# Patient Record
Sex: FEMALE | Race: ASIAN | NOT HISPANIC OR LATINO
[De-identification: names, ages, dates, MRNs, and addresses within clinical notes are randomized per-mention and may not be internally consistent; named-entity substitution may affect disease eponyms.]

---

## 2019-02-19 ENCOUNTER — APPOINTMENT (OUTPATIENT)
Dept: PULMONOLOGY | Facility: CLINIC | Age: 54
End: 2019-02-19
Payer: MEDICAID

## 2019-02-19 VITALS
DIASTOLIC BLOOD PRESSURE: 95 MMHG | HEART RATE: 111 BPM | SYSTOLIC BLOOD PRESSURE: 139 MMHG | HEIGHT: 62.5 IN | BODY MASS INDEX: 33.73 KG/M2 | OXYGEN SATURATION: 96 % | WEIGHT: 188 LBS

## 2019-02-19 DIAGNOSIS — J45.909 UNSPECIFIED ASTHMA, UNCOMPLICATED: ICD-10-CM

## 2019-02-19 DIAGNOSIS — G47.00 INSOMNIA, UNSPECIFIED: ICD-10-CM

## 2019-02-19 PROCEDURE — 94060 EVALUATION OF WHEEZING: CPT | Mod: 26

## 2019-02-19 PROCEDURE — 94729 DIFFUSING CAPACITY: CPT | Mod: 26

## 2019-02-19 PROCEDURE — 99204 OFFICE O/P NEW MOD 45 MIN: CPT | Mod: 25

## 2019-02-19 PROCEDURE — 94727 GAS DIL/WSHOT DETER LNG VOL: CPT | Mod: 26

## 2019-02-19 NOTE — REVIEW OF SYSTEMS
[Fatigue] : fatigue [Cough] : cough [Dyspnea] : dyspnea [Wheezing] : wheezing [Heartburn] : heartburn [Reflux] : reflux [Constipation] : constipation [Myalgias] : myalgias [Arthralgias] : arthralgias [Difficulty Initiating Sleep] : difficulty falling asleep [Difficulty Maintaining Sleep] : difficulty maintaining sleep [Negative] : Sleep Disorder

## 2019-02-20 PROBLEM — G47.00 INSOMNIA: Status: ACTIVE | Noted: 2019-02-20

## 2019-02-20 PROBLEM — J45.909 ASTHMA: Status: ACTIVE | Noted: 2019-02-20

## 2019-04-18 ENCOUNTER — APPOINTMENT (OUTPATIENT)
Dept: PULMONOLOGY | Facility: CLINIC | Age: 54
End: 2019-04-18

## 2019-04-18 NOTE — PROCEDURE
[FreeTextEntry1] : The pulmonary function testing done reveals moderate obstructive ventilatory impairment.

## 2019-04-18 NOTE — HISTORY OF PRESENT ILLNESS
[FreeTextEntry1] : Patient is a 53 year old lady with h/o asthma since age 16 years. It was chronic and she took steroids for many years.  She migrated from [facility] 30 years back.  She was hospitalized  for asthma about about 20 years back. She has improved a lot recently.  She rarely takes inhalers.\par She is a non smoker. She climbs stairs.  It does not bother her. \par She goes to bed around 12 mn to 2 AM. 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 Because of that she's not able to sleep at night. She is worried that her mother might need her. Due to lack of sleep during the night ,she is very exhausted during the day. She has not been exercising. She has gained weight in the last 6 months.\par

## 2019-04-18 NOTE — DISCUSSION/SUMMARY
[FreeTextEntry1] : The patient has moderate obstructive ventilatory impairment most likely bronchial asthma. The patient has not been currently treated for it.\par In addition the patient has significant sleep onset and sleep onset insomnia and anxiety related to personal issues. She has gained weight recently because of lifestyle issues.\par The patient advised to try and exercise in the morning daily. She was advised to fix the bedtime at night. She was advised to try to take melatonin and magnesium at bed time.\par She was advised to eat  low carbohydrate  largely plant-based diet.

## 2019-05-21 ENCOUNTER — INPATIENT (INPATIENT)
Facility: HOSPITAL | Age: 54
LOS: 1 days | Discharge: HOME | DRG: 66 | End: 2019-05-23
Attending: NEUROLOGICAL SURGERY | Admitting: NEUROLOGICAL SURGERY
Payer: MEDICAID

## 2019-05-21 ENCOUNTER — EMERGENCY (EMERGENCY)
Facility: HOSPITAL | Age: 54
LOS: 1 days | Discharge: ANOTHER HOSPITAL | End: 2019-05-21
Attending: EMERGENCY MEDICINE | Admitting: EMERGENCY MEDICINE
Payer: MEDICAID

## 2019-05-21 ENCOUNTER — APPOINTMENT (OUTPATIENT)
Age: 54
End: 2019-05-21
Payer: MEDICAID

## 2019-05-21 ENCOUNTER — TRANSCRIPTION ENCOUNTER (OUTPATIENT)
Age: 54
End: 2019-05-21

## 2019-05-21 VITALS
DIASTOLIC BLOOD PRESSURE: 110 MMHG | RESPIRATION RATE: 18 BRPM | TEMPERATURE: 98 F | OXYGEN SATURATION: 100 % | HEART RATE: 107 BPM | SYSTOLIC BLOOD PRESSURE: 160 MMHG

## 2019-05-21 VITALS
WEIGHT: 179.9 LBS | OXYGEN SATURATION: 96 % | DIASTOLIC BLOOD PRESSURE: 72 MMHG | SYSTOLIC BLOOD PRESSURE: 115 MMHG | HEART RATE: 122 BPM | RESPIRATION RATE: 19 BRPM | HEIGHT: 64 IN

## 2019-05-21 VITALS
SYSTOLIC BLOOD PRESSURE: 119 MMHG | HEART RATE: 102 BPM | OXYGEN SATURATION: 98 % | DIASTOLIC BLOOD PRESSURE: 82 MMHG | RESPIRATION RATE: 18 BRPM | TEMPERATURE: 99 F

## 2019-05-21 DIAGNOSIS — G91.9 HYDROCEPHALUS, UNSPECIFIED: ICD-10-CM

## 2019-05-21 DIAGNOSIS — I60.9 NONTRAUMATIC SUBARACHNOID HEMORRHAGE, UNSPECIFIED: ICD-10-CM

## 2019-05-21 LAB
ALBUMIN SERPL ELPH-MCNC: 4.4 G/DL — SIGNIFICANT CHANGE UP (ref 3.3–5)
ALBUMIN SERPL ELPH-MCNC: 4.4 G/DL — SIGNIFICANT CHANGE UP (ref 3.3–5)
ALP SERPL-CCNC: 131 U/L — HIGH (ref 40–120)
ALP SERPL-CCNC: 138 U/L — HIGH (ref 40–120)
ALT FLD-CCNC: 30 U/L — SIGNIFICANT CHANGE UP (ref 4–33)
ALT FLD-CCNC: 34 U/L — SIGNIFICANT CHANGE UP (ref 10–45)
ANION GAP SERPL CALC-SCNC: 14 MMO/L — SIGNIFICANT CHANGE UP (ref 7–14)
ANION GAP SERPL CALC-SCNC: 14 MMOL/L — SIGNIFICANT CHANGE UP (ref 5–17)
APTT BLD: 32.1 SEC — SIGNIFICANT CHANGE UP (ref 27.5–36.3)
APTT BLD: 34 SEC — SIGNIFICANT CHANGE UP (ref 27.5–36.3)
AST SERPL-CCNC: 20 U/L — SIGNIFICANT CHANGE UP (ref 10–40)
AST SERPL-CCNC: 21 U/L — SIGNIFICANT CHANGE UP (ref 4–32)
BASOPHILS # BLD AUTO: 0.05 K/UL — SIGNIFICANT CHANGE UP (ref 0–0.2)
BASOPHILS # BLD AUTO: 0.1 K/UL — SIGNIFICANT CHANGE UP (ref 0–0.2)
BASOPHILS NFR BLD AUTO: 0.4 % — SIGNIFICANT CHANGE UP (ref 0–2)
BASOPHILS NFR BLD AUTO: 0.5 % — SIGNIFICANT CHANGE UP (ref 0–2)
BILIRUB SERPL-MCNC: 0.3 MG/DL — SIGNIFICANT CHANGE UP (ref 0.2–1.2)
BILIRUB SERPL-MCNC: 0.3 MG/DL — SIGNIFICANT CHANGE UP (ref 0.2–1.2)
BLD GP AB SCN SERPL QL: NEGATIVE — SIGNIFICANT CHANGE UP
BLD GP AB SCN SERPL QL: NEGATIVE — SIGNIFICANT CHANGE UP
BUN SERPL-MCNC: 6 MG/DL — LOW (ref 7–23)
BUN SERPL-MCNC: 8 MG/DL — SIGNIFICANT CHANGE UP (ref 7–23)
CALCIUM SERPL-MCNC: 9.8 MG/DL — SIGNIFICANT CHANGE UP (ref 8.4–10.5)
CALCIUM SERPL-MCNC: 9.8 MG/DL — SIGNIFICANT CHANGE UP (ref 8.4–10.5)
CHLORIDE SERPL-SCNC: 100 MMOL/L — SIGNIFICANT CHANGE UP (ref 96–108)
CHLORIDE SERPL-SCNC: 97 MMOL/L — LOW (ref 98–107)
CHOLEST SERPL-MCNC: 194 MG/DL — SIGNIFICANT CHANGE UP (ref 10–199)
CO2 SERPL-SCNC: 25 MMOL/L — SIGNIFICANT CHANGE UP (ref 22–31)
CO2 SERPL-SCNC: 25 MMOL/L — SIGNIFICANT CHANGE UP (ref 22–31)
CREAT SERPL-MCNC: 0.54 MG/DL — SIGNIFICANT CHANGE UP (ref 0.5–1.3)
CREAT SERPL-MCNC: 0.64 MG/DL — SIGNIFICANT CHANGE UP (ref 0.5–1.3)
EOSINOPHIL # BLD AUTO: 0 K/UL — SIGNIFICANT CHANGE UP (ref 0–0.5)
EOSINOPHIL # BLD AUTO: 0.02 K/UL — SIGNIFICANT CHANGE UP (ref 0–0.5)
EOSINOPHIL NFR BLD AUTO: 0.1 % — SIGNIFICANT CHANGE UP (ref 0–6)
EOSINOPHIL NFR BLD AUTO: 0.4 % — SIGNIFICANT CHANGE UP (ref 0–6)
GLUCOSE SERPL-MCNC: 134 MG/DL — HIGH (ref 70–99)
GLUCOSE SERPL-MCNC: 146 MG/DL — HIGH (ref 70–99)
HBA1C BLD-MCNC: 6.3 % — HIGH (ref 4–5.6)
HCT VFR BLD CALC: 36.6 % — SIGNIFICANT CHANGE UP (ref 34.5–45)
HCT VFR BLD CALC: 37.3 % — SIGNIFICANT CHANGE UP (ref 34.5–45)
HDLC SERPL-MCNC: 56 MG/DL — SIGNIFICANT CHANGE UP
HGB BLD-MCNC: 11.7 G/DL — SIGNIFICANT CHANGE UP (ref 11.5–15.5)
HGB BLD-MCNC: 12.5 G/DL — SIGNIFICANT CHANGE UP (ref 11.5–15.5)
IMM GRANULOCYTES NFR BLD AUTO: 0.5 % — SIGNIFICANT CHANGE UP (ref 0–1.5)
INR BLD: 1.03 — SIGNIFICANT CHANGE UP (ref 0.88–1.17)
INR BLD: 1.05 RATIO — SIGNIFICANT CHANGE UP (ref 0.88–1.16)
LIPID PNL WITH DIRECT LDL SERPL: 123 MG/DL — HIGH
LYMPHOCYTES # BLD AUTO: 14.7 % — SIGNIFICANT CHANGE UP (ref 13–44)
LYMPHOCYTES # BLD AUTO: 2.03 K/UL — SIGNIFICANT CHANGE UP (ref 1–3.3)
LYMPHOCYTES # BLD AUTO: 2.6 K/UL — SIGNIFICANT CHANGE UP (ref 1–3.3)
LYMPHOCYTES # BLD AUTO: 22.6 % — SIGNIFICANT CHANGE UP (ref 13–44)
MAGNESIUM SERPL-MCNC: 2.1 MG/DL — SIGNIFICANT CHANGE UP (ref 1.6–2.6)
MCHC RBC-ENTMCNC: 24.7 PG — LOW (ref 27–34)
MCHC RBC-ENTMCNC: 26.4 PG — LOW (ref 27–34)
MCHC RBC-ENTMCNC: 32 % — SIGNIFICANT CHANGE UP (ref 32–36)
MCHC RBC-ENTMCNC: 33.4 GM/DL — SIGNIFICANT CHANGE UP (ref 32–36)
MCV RBC AUTO: 77.4 FL — LOW (ref 80–100)
MCV RBC AUTO: 79 FL — LOW (ref 80–100)
MONOCYTES # BLD AUTO: 0.49 K/UL — SIGNIFICANT CHANGE UP (ref 0–0.9)
MONOCYTES # BLD AUTO: 0.6 K/UL — SIGNIFICANT CHANGE UP (ref 0–0.9)
MONOCYTES NFR BLD AUTO: 3.6 % — SIGNIFICANT CHANGE UP (ref 2–14)
MONOCYTES NFR BLD AUTO: 4.7 % — SIGNIFICANT CHANGE UP (ref 2–14)
NEUTROPHILS # BLD AUTO: 11.11 K/UL — HIGH (ref 1.8–7.4)
NEUTROPHILS # BLD AUTO: 8.4 K/UL — HIGH (ref 1.8–7.4)
NEUTROPHILS NFR BLD AUTO: 71.8 % — SIGNIFICANT CHANGE UP (ref 43–77)
NEUTROPHILS NFR BLD AUTO: 80.7 % — HIGH (ref 43–77)
NRBC # FLD: 0 K/UL — SIGNIFICANT CHANGE UP (ref 0–0)
PHOSPHATE SERPL-MCNC: 4 MG/DL — SIGNIFICANT CHANGE UP (ref 2.5–4.5)
PLATELET # BLD AUTO: 333 K/UL — SIGNIFICANT CHANGE UP (ref 150–400)
PLATELET # BLD AUTO: 336 K/UL — SIGNIFICANT CHANGE UP (ref 150–400)
PMV BLD: 10.9 FL — SIGNIFICANT CHANGE UP (ref 7–13)
POTASSIUM SERPL-MCNC: 3.7 MMOL/L — SIGNIFICANT CHANGE UP (ref 3.5–5.3)
POTASSIUM SERPL-MCNC: 4.1 MMOL/L — SIGNIFICANT CHANGE UP (ref 3.5–5.3)
POTASSIUM SERPL-SCNC: 3.7 MMOL/L — SIGNIFICANT CHANGE UP (ref 3.5–5.3)
POTASSIUM SERPL-SCNC: 4.1 MMOL/L — SIGNIFICANT CHANGE UP (ref 3.5–5.3)
PROT SERPL-MCNC: 7.7 G/DL — SIGNIFICANT CHANGE UP (ref 6–8.3)
PROT SERPL-MCNC: 7.8 G/DL — SIGNIFICANT CHANGE UP (ref 6–8.3)
PROTHROM AB SERPL-ACNC: 11.8 SEC — SIGNIFICANT CHANGE UP (ref 9.8–13.1)
PROTHROM AB SERPL-ACNC: 12 SEC — SIGNIFICANT CHANGE UP (ref 10–12.9)
RBC # BLD: 4.72 M/UL — SIGNIFICANT CHANGE UP (ref 3.8–5.2)
RBC # BLD: 4.73 M/UL — SIGNIFICANT CHANGE UP (ref 3.8–5.2)
RBC # FLD: 15.4 % — HIGH (ref 10.3–14.5)
RBC # FLD: 17.4 % — HIGH (ref 10.3–14.5)
RH IG SCN BLD-IMP: NEGATIVE — SIGNIFICANT CHANGE UP
SODIUM SERPL-SCNC: 136 MMOL/L — SIGNIFICANT CHANGE UP (ref 135–145)
SODIUM SERPL-SCNC: 139 MMOL/L — SIGNIFICANT CHANGE UP (ref 135–145)
TOTAL CHOLESTEROL/HDL RATIO MEASUREMENT: 3.5 RATIO — SIGNIFICANT CHANGE UP (ref 3.3–7.1)
TRIGL SERPL-MCNC: 75 MG/DL — SIGNIFICANT CHANGE UP (ref 10–149)
WBC # BLD: 11.8 K/UL — HIGH (ref 3.8–10.5)
WBC # BLD: 13.77 K/UL — HIGH (ref 3.8–10.5)
WBC # FLD AUTO: 11.8 K/UL — HIGH (ref 3.8–10.5)
WBC # FLD AUTO: 13.77 K/UL — HIGH (ref 3.8–10.5)

## 2019-05-21 PROCEDURE — 99291 CRITICAL CARE FIRST HOUR: CPT

## 2019-05-21 PROCEDURE — 99285 EMERGENCY DEPT VISIT HI MDM: CPT | Mod: 25

## 2019-05-21 PROCEDURE — 99284 EMERGENCY DEPT VISIT MOD MDM: CPT

## 2019-05-21 PROCEDURE — 70450 CT HEAD/BRAIN W/O DYE: CPT | Mod: 26,79

## 2019-05-21 PROCEDURE — 99292 CRITICAL CARE ADDL 30 MIN: CPT

## 2019-05-21 PROCEDURE — 70498 CT ANGIOGRAPHY NECK: CPT | Mod: 26

## 2019-05-21 PROCEDURE — 93010 ELECTROCARDIOGRAM REPORT: CPT

## 2019-05-21 PROCEDURE — 36226 PLACE CATH VERTEBRAL ART: CPT | Mod: 50

## 2019-05-21 PROCEDURE — 70496 CT ANGIOGRAPHY HEAD: CPT | Mod: 26

## 2019-05-21 PROCEDURE — 36224 PLACE CATH CAROTD ART: CPT | Mod: 50

## 2019-05-21 PROCEDURE — 36227 PLACE CATH XTRNL CAROTID: CPT | Mod: 50

## 2019-05-21 RX ORDER — ACETAMINOPHEN 500 MG
650 TABLET ORAL EVERY 6 HOURS
Refills: 0 | Status: DISCONTINUED | OUTPATIENT
Start: 2019-05-21 | End: 2019-05-23

## 2019-05-21 RX ORDER — LEVETIRACETAM 250 MG/1
500 TABLET, FILM COATED ORAL
Refills: 0 | Status: DISCONTINUED | OUTPATIENT
Start: 2019-05-21 | End: 2019-05-21

## 2019-05-21 RX ORDER — NIMODIPINE 60 MG/10ML
60 SOLUTION ORAL EVERY 4 HOURS
Refills: 0 | Status: DISCONTINUED | OUTPATIENT
Start: 2019-05-21 | End: 2019-05-21

## 2019-05-21 RX ORDER — MAGNESIUM CARBONATE 54 MG/5 ML
0 LIQUID (ML) ORAL
Qty: 0 | Refills: 0 | DISCHARGE

## 2019-05-21 RX ORDER — SENNA PLUS 8.6 MG/1
2 TABLET ORAL AT BEDTIME
Refills: 0 | Status: DISCONTINUED | OUTPATIENT
Start: 2019-05-21 | End: 2019-05-23

## 2019-05-21 RX ORDER — LEVETIRACETAM 250 MG/1
500 TABLET, FILM COATED ORAL EVERY 12 HOURS
Refills: 0 | Status: DISCONTINUED | OUTPATIENT
Start: 2019-05-21 | End: 2019-05-21

## 2019-05-21 RX ORDER — OXYCODONE HYDROCHLORIDE 5 MG/1
5 TABLET ORAL EVERY 4 HOURS
Refills: 0 | Status: DISCONTINUED | OUTPATIENT
Start: 2019-05-21 | End: 2019-05-23

## 2019-05-21 RX ORDER — ACETAMINOPHEN 500 MG
975 TABLET ORAL ONCE
Refills: 0 | Status: COMPLETED | OUTPATIENT
Start: 2019-05-21 | End: 2019-05-21

## 2019-05-21 RX ORDER — METOCLOPRAMIDE HCL 10 MG
10 TABLET ORAL ONCE
Refills: 0 | Status: COMPLETED | OUTPATIENT
Start: 2019-05-21 | End: 2019-05-21

## 2019-05-21 RX ORDER — OXYCODONE HYDROCHLORIDE 5 MG/1
10 TABLET ORAL EVERY 4 HOURS
Refills: 0 | Status: DISCONTINUED | OUTPATIENT
Start: 2019-05-21 | End: 2019-05-23

## 2019-05-21 RX ORDER — NICARDIPINE HYDROCHLORIDE 30 MG/1
5 CAPSULE, EXTENDED RELEASE ORAL
Qty: 40 | Refills: 0 | Status: DISCONTINUED | OUTPATIENT
Start: 2019-05-21 | End: 2019-05-21

## 2019-05-21 RX ORDER — SODIUM CHLORIDE 9 MG/ML
1000 INJECTION INTRAMUSCULAR; INTRAVENOUS; SUBCUTANEOUS ONCE
Refills: 0 | Status: COMPLETED | OUTPATIENT
Start: 2019-05-21 | End: 2019-05-21

## 2019-05-21 RX ORDER — LIDOCAINE 4 G/100G
1 CREAM TOPICAL ONCE
Refills: 0 | Status: COMPLETED | OUTPATIENT
Start: 2019-05-21 | End: 2019-05-21

## 2019-05-21 RX ORDER — DOCUSATE SODIUM 100 MG
100 CAPSULE ORAL
Refills: 0 | Status: DISCONTINUED | OUTPATIENT
Start: 2019-05-21 | End: 2019-05-23

## 2019-05-21 RX ORDER — SODIUM CHLORIDE 9 MG/ML
1000 INJECTION INTRAMUSCULAR; INTRAVENOUS; SUBCUTANEOUS
Refills: 0 | Status: DISCONTINUED | OUTPATIENT
Start: 2019-05-21 | End: 2019-05-22

## 2019-05-21 RX ORDER — CHLORHEXIDINE GLUCONATE 213 G/1000ML
1 SOLUTION TOPICAL
Refills: 0 | Status: DISCONTINUED | OUTPATIENT
Start: 2019-05-21 | End: 2019-05-22

## 2019-05-21 RX ORDER — LEVETIRACETAM 250 MG/1
1000 TABLET, FILM COATED ORAL ONCE
Refills: 0 | Status: COMPLETED | OUTPATIENT
Start: 2019-05-21 | End: 2019-05-21

## 2019-05-21 RX ADMIN — OXYCODONE HYDROCHLORIDE 10 MILLIGRAM(S): 5 TABLET ORAL at 13:59

## 2019-05-21 RX ADMIN — SENNA PLUS 2 TABLET(S): 8.6 TABLET ORAL at 21:23

## 2019-05-21 RX ADMIN — SODIUM CHLORIDE 75 MILLILITER(S): 9 INJECTION INTRAMUSCULAR; INTRAVENOUS; SUBCUTANEOUS at 08:51

## 2019-05-21 RX ADMIN — SODIUM CHLORIDE 1000 MILLILITER(S): 9 INJECTION INTRAMUSCULAR; INTRAVENOUS; SUBCUTANEOUS at 04:14

## 2019-05-21 RX ADMIN — OXYCODONE HYDROCHLORIDE 10 MILLIGRAM(S): 5 TABLET ORAL at 18:31

## 2019-05-21 RX ADMIN — SODIUM CHLORIDE 75 MILLILITER(S): 9 INJECTION INTRAMUSCULAR; INTRAVENOUS; SUBCUTANEOUS at 17:50

## 2019-05-21 RX ADMIN — LEVETIRACETAM 400 MILLIGRAM(S): 250 TABLET, FILM COATED ORAL at 17:46

## 2019-05-21 RX ADMIN — NICARDIPINE HYDROCHLORIDE 25 MG/HR: 30 CAPSULE, EXTENDED RELEASE ORAL at 09:07

## 2019-05-21 RX ADMIN — OXYCODONE HYDROCHLORIDE 10 MILLIGRAM(S): 5 TABLET ORAL at 19:00

## 2019-05-21 RX ADMIN — Medication 10 MILLIGRAM(S): at 02:45

## 2019-05-21 RX ADMIN — Medication 650 MILLIGRAM(S): at 08:51

## 2019-05-21 RX ADMIN — CHLORHEXIDINE GLUCONATE 1 APPLICATION(S): 213 SOLUTION TOPICAL at 21:23

## 2019-05-21 RX ADMIN — Medication 975 MILLIGRAM(S): at 04:13

## 2019-05-21 RX ADMIN — OXYCODONE HYDROCHLORIDE 10 MILLIGRAM(S): 5 TABLET ORAL at 14:30

## 2019-05-21 RX ADMIN — NICARDIPINE HYDROCHLORIDE 25 MG/HR: 30 CAPSULE, EXTENDED RELEASE ORAL at 17:50

## 2019-05-21 RX ADMIN — LEVETIRACETAM 400 MILLIGRAM(S): 250 TABLET, FILM COATED ORAL at 06:43

## 2019-05-21 RX ADMIN — LIDOCAINE 1 PATCH: 4 CREAM TOPICAL at 04:41

## 2019-05-21 RX ADMIN — SODIUM CHLORIDE 1000 MILLILITER(S): 9 INJECTION INTRAMUSCULAR; INTRAVENOUS; SUBCUTANEOUS at 02:46

## 2019-05-21 NOTE — ED PROVIDER NOTE - PHYSICAL EXAMINATION
Neuro: CN2-12 grossly intact, A&Ox4, MS +5/5 in UE and LE BL, gross sensation intact in UE and LE BL

## 2019-05-21 NOTE — H&P ADULT - HISTORY OF PRESENT ILLNESS
p ([id number])     HPI: Alexandreazeke Garcia, 54 YO right handed female pmh asthma had sudden onset of WHOL while vacuuming last evening. States she had nausea and a brief period of diplopia which have both resolved. C/O severe headache mainly occipital, no neck pain. Denies focal motor or sensory loss, denies lethargy.HH1/MF1. CTH showed perimes heme. No ac/antiplt.

## 2019-05-21 NOTE — ED ADULT NURSE REASSESSMENT NOTE - NS ED NURSE REASSESS COMMENT FT1
Cardene stopped at 0917 due to blood pressure within normal limits, Neuro surg resident at bedside to transport the patient

## 2019-05-21 NOTE — ED ADULT NURSE NOTE - OBJECTIVE STATEMENT
received to room 24. complains of sudden onset headache 3 hours. also nausea/vomiting. denies any blurry vision, dizziness or neuro deficits. medicated as ordered. awaits ct in nad.

## 2019-05-21 NOTE — PROGRESS NOTE ADULT - SUBJECTIVE AND OBJECTIVE BOX
O:    T(C): 36.6 (05-21-19 @ 19:00), Max: 37.1 (05-21-19 @ 05:57)  HR: 115 (05-21-19 @ 19:15) (96 - 131)  BP: 129/89 (05-21-19 @ 18:00) (109/84 - 160/110)  RR: 16 (05-21-19 @ 19:15) (11 - 33)  SpO2: 94% (05-21-19 @ 19:15) (93% - 100%)  05-21-19 @ 07:01  -  05-21-19 @ 20:34  --------------------------------------------------------  IN: 920 mL / OUT: 700 mL / NET: 220 mL    acetaminophen   Tablet .. 650 milliGRAM(s) Oral every 6 hours PRN  chlorhexidine 4% Liquid 1 Application(s) Topical <User Schedule>  docusate sodium 100 milliGRAM(s) Oral two times a day  levETIRAcetam  IVPB 500 milliGRAM(s) IV Intermittent every 12 hours  niCARdipine Infusion 5 mG/Hr IV Continuous <Continuous>  niMODipine 60 milliGRAM(s) Oral every 4 hours  oxyCODONE    IR 5 milliGRAM(s) Oral every 4 hours PRN  oxyCODONE    IR 10 milliGRAM(s) Oral every 4 hours PRN  senna 2 Tablet(s) Oral at bedtime  sodium chloride 0.9%. 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 IR for aneurysm treatment  Seizure Prophylaxis: Levetiracetam until aneurysm treated  Delayed Cerebral Ischemia Management: Serial screening TCDs, euvolemia, nimodipine  Pain: PRN analgesics with Tylenol   Activity: [] OOB as tolerated [] Bedrest [] PT [] OT [] PMNR    PULM:  Incentive spirometry    CV:  SBP goal  TTE  Place [place]     RENAL:  Fluids:  NS @ 75   Parmar before Angio     GI:  Diet:  GI prophylaxis [x] not indicated [] PPI [] other:  Bowel regimen [x] colace [x] senna [] other:    ENDO:   Goal euglycemia (-180)    HEME/ONC:  VTE prophylaxis: [] SCDs [] chemoprophylaxis [x] hold chemoprophylaxis due to:  SAH   [] high risk of DVT/PE on admission due to:    ID: afebrile     SOCIAL/FAMILY:  [] awaiting [x] updated at bedside [] family meeting    CODE STATUS:  [x] Full Code [] DNR [] DNI [] Palliative/Comfort Care    DISPOSITION:  [x] ICU [] Stroke Unit [] Floor [] EMU [] RCU [] PCU    [x] Patient is at high risk of neurologic deterioration/death due to:  sezirues, herniation     Time spent: 45 critical care minutes O: angio showed no vascular malformation, no aneurysm     T(C): 36.6 (05-21-19 @ 19:00), Max: 37.1 (05-21-19 @ 05:57)  HR: 115 (05-21-19 @ 19:15) (96 - 131)  BP: 129/89 (05-21-19 @ 18:00) (109/84 - 160/110)  RR: 16 (05-21-19 @ 19:15) (11 - 33)  SpO2: 94% (05-21-19 @ 19:15) (93% - 100%)  05-21-19 @ 07:01  -  05-21-19 @ 20:34  --------------------------------------------------------  IN: 920 mL / OUT: 700 mL / NET: 220 mL    acetaminophen   Tablet .. 650 milliGRAM(s) Oral every 6 hours PRN  chlorhexidine 4% Liquid 1 Application(s) Topical <User Schedule>  docusate sodium 100 milliGRAM(s) Oral two times a day  levETIRAcetam  IVPB 500 milliGRAM(s) IV Intermittent every 12 hours  niCARdipine Infusion 5 mG/Hr IV Continuous <Continuous>  niMODipine 60 milliGRAM(s) Oral every 4 hours  oxyCODONE    IR 5 milliGRAM(s) Oral every 4 hours PRN  oxyCODONE    IR 10 milliGRAM(s) Oral every 4 hours PRN  senna 2 Tablet(s) Oral at bedtime  sodium chloride 0.9%. 1000 milliLiter(s) IV Continuous <Continuous>    EXAMINATION:  General:  calm  HEENT:  MMM  Neuro:  awake, alert, oriented x 3, follows commands, face symmetric  Pupils Equal and reactive, RUE 5/5,  LUE 5/5,  RLE 5/5,  LLE 5/5, no drift   Cards:  s1, s2 RRR  Respiratory:  lungs clear b/l   Adomen:  soft  Extremities:  no edema  Skin:  warm/dry      LABS:  Na: 139 (05-21 @ 11:55), 136 (05-21 @ 06:32)  K: 3.7 (05-21 @ 11:55), 4.1 (05-21 @ 06:32)  Cl: 100 (05-21 @ 11:55), 97 (05-21 @ 06:32)  CO2: 25 (05-21 @ 11:55), 25 (05-21 @ 06:32)  BUN: 6 (05-21 @ 11:55), 8 (05-21 @ 06:32)  Cr: 0.54 (05-21 @ 11:55), 0.64 (05-21 @ 06:32)  Glu: 134(05-21 @ 11:55), 146(05-21 @ 06:32)    Hgb: 12.5 (05-21 @ 11:55), 11.7 (05-21 @ 06:32)  Hct: 37.3 (05-21 @ 11:55), 36.6 (05-21 @ 06:32)  WBC: 11.8 (05-21 @ 11:55), 13.77 (05-21 @ 06:32)  Plt: 333 (05-21 @ 11:55), 336 (05-21 @ 06:32)    INR: 1.05 05-21-19 @ 11:55, 1.03 05-21-19 @ 06:32  PTT: 34.0 05-21-19 @ 11:55, 32.1 05-21-19 @ 06:32      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, MF1 Perimesencephalic SAH angio negative     NEURO:  Diagnosis: will need MRI brain and cervical spine to r/o vascular malformation   d/c keppra, d/c nimodipine as it is perimesencephalic   Pain: PRN analgesics with Tylenol   Activity: [] OOB as tolerated [] Bedrest [] PT [] OT [] PMNR    PULM:  Incentive spirometry    CV:  SBP goal 100-140 mmhg   TTE  Place [place]   groin check  pulse check     RENAL:  Fluids:  NS @ 75   Parmar     GI:  Diet: resume diet   GI prophylaxis [x] not indicated [] PPI [] other:  Bowel regimen [x] colace [x] senna [] other:    ENDO:   Goal euglycemia (-180)    HEME/ONC:  VTE prophylaxis: [] SCDs [] chemoprophylaxis [x] hold chemoprophylaxis due to:  SAH PBD0   [] high risk of DVT/PE on admission due to:    ID: afebrile     SOCIAL/FAMILY:  [] awaiting [x] updated at bedside [] family meeting    CODE STATUS:  [x] Full Code [] DNR [] DNI [] Palliative/Comfort Care    DISPOSITION:  [x] ICU [] Stroke Unit [] Floor [] EMU [] RCU [] PCU    [x] Patient is at high risk of neurologic deterioration/death due to:  sezirues, herniation     Time spent: 45 critical care minutes O: angio showed no vascular malformation, no aneurysm     T(C): 36.6 (05-21-19 @ 19:00), Max: 37.1 (05-21-19 @ 05:57)  HR: 115 (05-21-19 @ 19:15) (96 - 131)  BP: 129/89 (05-21-19 @ 18:00) (109/84 - 160/110)  RR: 16 (05-21-19 @ 19:15) (11 - 33)  SpO2: 94% (05-21-19 @ 19:15) (93% - 100%)  05-21-19 @ 07:01  -  05-21-19 @ 20:34  --------------------------------------------------------  IN: 920 mL / OUT: 700 mL / NET: 220 mL    acetaminophen   Tablet .. 650 milliGRAM(s) Oral every 6 hours PRN  chlorhexidine 4% Liquid 1 Application(s) Topical <User Schedule>  docusate sodium 100 milliGRAM(s) Oral two times a day  levETIRAcetam  IVPB 500 milliGRAM(s) IV Intermittent every 12 hours  niCARdipine Infusion 5 mG/Hr IV Continuous <Continuous>  niMODipine 60 milliGRAM(s) Oral every 4 hours  oxyCODONE    IR 5 milliGRAM(s) Oral every 4 hours PRN  oxyCODONE    IR 10 milliGRAM(s) Oral every 4 hours PRN  senna 2 Tablet(s) Oral at bedtime  sodium chloride 0.9%. 1000 milliLiter(s) IV Continuous <Continuous>    EXAMINATION:  General:  calm  HEENT:  MMM  Neuro:  awake, alert, oriented x 3, follows commands, face symmetric  Pupils Equal and reactive, RUE 5/5,  LUE 5/5,  RLE 5/5,  LLE 5/5, no drift   Cards:  s1, s2 RRR  Respiratory:  lungs clear b/l   Adomen:  soft  Extremities:  no edema, no groin hematoma, normal pulses   Skin:  warm/dry      LABS:  Na: 139 (05-21 @ 11:55), 136 (05-21 @ 06:32)  K: 3.7 (05-21 @ 11:55), 4.1 (05-21 @ 06:32)  Cl: 100 (05-21 @ 11:55), 97 (05-21 @ 06:32)  CO2: 25 (05-21 @ 11:55), 25 (05-21 @ 06:32)  BUN: 6 (05-21 @ 11:55), 8 (05-21 @ 06:32)  Cr: 0.54 (05-21 @ 11:55), 0.64 (05-21 @ 06:32)  Glu: 134(05-21 @ 11:55), 146(05-21 @ 06:32)    Hgb: 12.5 (05-21 @ 11:55), 11.7 (05-21 @ 06:32)  Hct: 37.3 (05-21 @ 11:55), 36.6 (05-21 @ 06:32)  WBC: 11.8 (05-21 @ 11:55), 13.77 (05-21 @ 06:32)  Plt: 333 (05-21 @ 11:55), 336 (05-21 @ 06:32)    INR: 1.05 05-21-19 @ 11:55, 1.03 05-21-19 @ 06:32  PTT: 34.0 05-21-19 @ 11:55, 32.1 05-21-19 @ 06:32      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, MF1 Perimesencephalic SAH angio negative     NEURO:  Diagnosis: will need MRI brain and cervical spine to r/o vascular malformation   d/c keppra, d/c nimodipine as it is perimesencephalic   Pain: PRN analgesics with Tylenol   Activity: [] OOB as tolerated [] Bedrest [] PT [] OT [] PMNR    PULM:  Incentive spirometry    CV:  SBP goal 100-140 mmhg   TTE  Place [place]   groin check  pulse check     RENAL:  Fluids:  NS @ 75   Parmar     GI:  Diet: resume diet   GI prophylaxis [x] not indicated [] PPI [] other:  Bowel regimen [x] colace [x] senna [] other:    ENDO:   Goal euglycemia (-180)    HEME/ONC:  VTE prophylaxis: [] SCDs [] chemoprophylaxis [x] hold chemoprophylaxis due to:  SAH PBD0   [] high risk of DVT/PE on admission due to:    ID: afebrile     SOCIAL/FAMILY:  [] awaiting [x] updated at bedside [] family meeting    CODE STATUS:  [x] Full Code [] DNR [] DNI [] Palliative/Comfort Care    DISPOSITION:  [x] ICU [] Stroke Unit [] Floor [] EMU [] RCU [] PCU    [x] Patient is at high risk of neurologic deterioration/death due to:  seziruchristiano, herniation     Time spent: 45 critical care minutes

## 2019-05-21 NOTE — ED PROVIDER NOTE - ATTENDING CONTRIBUTION TO CARE
Attending MD Baez:  I personally have seen and examined this patient.  Resident note reviewed and agree on plan of care and except where noted.  See MDM for details.

## 2019-05-21 NOTE — DISCHARGE NOTE NURSING/CASE MANAGEMENT/SOCIAL WORK - NSDCPEPTSTRK_GEN_ALL_CORE
Stroke education booklet/Risk factors for stroke/Prescribed medications/Stroke support groups for patients, families, and friends/Stroke warning signs and symptoms/Signs and symptoms of stroke/Call 911 for stroke/Need for follow up after discharge

## 2019-05-21 NOTE — ED ADULT NURSE NOTE - OBJECTIVE STATEMENT
Pt is a 52 yo female transferred here from [facility] for a SAH. As per pt last night around 8 pm while vacuuming pt began having a sudden intense headache. After the headache was persistent pt went to [facility] around 11 pm, The CT was shown to have a SAH. Pt is reporting headache 2/10, she denies any N/V/D no cough fever or chills no CP or SOB. Pt A&Ox4, strength equal bilaterally TRENT x4, Pt has pmh of asthma.

## 2019-05-21 NOTE — ED PROVIDER NOTE - ATTENDING CONTRIBUTION TO CARE
No photophobia. No vision changes. No speech deficit. No fever. No neck pain. No focal weakness. Afebrile. Neck supple. Neurologic exam: A&O x3, speech clear, CORKY, CN II-XII intact, motor strength +5/5 in all extremities, sensation equal bilaterally to light touch, finger-to-nose normal, gait steady. Pain medication, IVF, and Reglan IV for symptom control. Reassess.    CT Head with 6 hours onset  Non-focal neuro exam  Supportive care, reassess, Neuro f/u outpt. No photophobia. No vision changes. No speech deficit. No fever. +Occipital base and upper b/l paraspinal TTP, no mid-line. No focal weakness. Afebrile. Neck supple. Neurologic exam: A&O x3, speech clear, CORKY, CN II-XII intact, motor strength +5/5 in all extremities, sensation equal bilaterally to light touch, finger-to-nose normal, gait steady. Pain medication, IVF, and Reglan IV for symptom control. Reassess.    CT Head r/o SAH within 6 hours onset  Non-focal neuro exam  Supportive meds for HA (Reglan, Tylenol and lidoderm patch)    +SAH on CTH: Patient transferred in stable condition to [facility] for NeuroSx eval.

## 2019-05-21 NOTE — ED ADULT TRIAGE NOTE - CHIEF COMPLAINT QUOTE
Pt BIBA for HA starting 30 mins ago associated with n/v.  neuro intact.  HTN in triage.  denies and pmhx. Pt BIBA for HA starting 30 mins ago associated with n/v.  neuro intact.  HTN in triage.  denies and pmhx.    Pt now with c/o of double vision/blurry vision/ difficulty breathing.  Respirations even unlabored at this time.  EKG to be completed.

## 2019-05-21 NOTE — ED ADULT TRIAGE NOTE - CHIEF COMPLAINT QUOTE
transfer for neuro- pt has spontaneous sub arachnoid- pt alert oriented x4 speaking coherently. no neuro deficit as per EMS

## 2019-05-21 NOTE — CHART NOTE - NSCHARTNOTEFT_GEN_A_CORE
Interventional Neuro- Radiology   Procedure Note PA-C    Procedure: Selective Cerebral Angiography   Pre- Procedure Diagnosis: acute subarachnoid hemorrhage   Post- Procedure Diagnosis:    : Dr. Gavin Mora  Fellow:     Dr Kiki Black   Physician Assistant: Iris Farah PA-C    Nurse:                    Mikaela Benítez RN  Radiologic Tech:  Anesthesiologist:     Dr Alexandr Fuller   Sheath:    I/Os: EBL less than 10cc  IV fluids:     cc  Urine output     cc  Contrast Omnipaque 240           Vitals: BP         HR      Spo2        Preliminary Report:  Using a 5 Belarusian to the right groin under MAC sedation via   left vertebral artery,  left common carotid artery, left external carotid artery, right vertebral artery  right common carotid artery, right external carotid artery a selective cerebral angiography was performed and demonstrated Official note to follow  Patient tolerated procedure well, hemodynamically stable, no change in neurological status compared to baseline. Results discussed with neuro ICU, patient and patient's family. Right groin sheath was removed, manual compression held to hemostasis for 20 minutes, no active bleeding, no hematoma, quick clot and safeguard balloon dressing applied at Interventional Neuro- Radiology   Procedure Note PA-C    Procedure: Selective Cerebral Angiography   Pre- Procedure Diagnosis: acute subarachnoid hemorrhage   Post- Procedure Diagnosis:    : Dr. Gavin Mora  Fellow:     Dr Kiki Black   Physician Assistant: Iris Farah PA-C    Nurse:                      Mikaela Benítez RN  Radiologic Tech:      Kenneth Aj LRT         Niki Martinez LRT  Anesthesiologist:     Dr Alexandr Fuller   Sheath:    I/Os: EBL less than 10cc  IV fluids:     cc  Urine output     cc  Contrast Omnipaque 240           Vitals: BP  129/100            Spo2  100%     Preliminary Report:  Using a 5 Argentine to the right groin under MAC sedation via   left vertebral artery,  left common carotid artery, left external carotid artery, right vertebral artery  right common carotid artery, right external carotid artery a selective cerebral angiography was performed and demonstrated Official note to follow  Patient tolerated procedure well, hemodynamically stable, no change in neurological status compared to baseline. Results discussed with neuro ICU, patient and patient's family. Right groin sheath was removed, manual compression held to hemostasis for 20 minutes, no active bleeding, no hematoma, quick clot and safeguard balloon dressing applied at Interventional Neuro- Radiology   Procedure Note PA-C    Procedure: Selective Cerebral Angiography   Pre- Procedure Diagnosis: acute subarachnoid hemorrhage   Post- Procedure Diagnosis: no aneurysm and no vascular malormation     : Dr. Gavin Mora  Fellow:     Dr Kiki Black   Resident: Dr Lg Daugherty  Physician Assistant: CRISTINE Campos-C    Nurse:                      Mikaela Benítez RN  Radiologic Tech:      Kenneth Aj LRT           Niki Martinez LRT  Anesthesiologist:     Dr Alexandr Fuller   Sheath:                   5 Telugu long sheath     I/Os: EBL less than 10cc  IV fluids:250cc  Urine output due to void Contrast Omni 240  168cc         Vitals: BP  129/100            Spo2  100%     Preliminary Report:  Using a 5 Telugu to the right groin under MAC sedation via left vertebral artery, left internal carotid artery, left external carotid artery, right vertebral artery, right internal carotid artery, right external carotid artery a selective cerebral angiography was performed and demonstrated no aneurysm and no vascular malformation. No vascular abnormality to explain hemorrhage. Official note to follow  Patient tolerated procedure well, hemodynamically stable, no change in neurological status compared to baseline. Results discussed with neuro ICU, patient and patient's . Right groin sheath was removed, manual compression held to hemostasis for 20 minutes, no active bleeding, no hematoma, quick clot and safeguard balloon dressing applied at 2100 hours.

## 2019-05-21 NOTE — CHART NOTE - NSCHARTNOTEFT_GEN_A_CORE
CAPRINI SCORE [CLOT] Score on Admission for     AGE RELATED RISK FACTORS                                                       MOBILITY RELATED FACTORS  [x ] Age 41-60 years                                            (1 Point)                  [ ] Bed rest                                                        (1 Point)  [ ] Age: 61-74 years                                           (2 Points)                 [ ] Plaster cast                                                   (2 Points)  [ ] Age= 75 years                                              (3 Points)                 [ ] Bed bound for more than 72 hours                 (2 Points)    DISEASE RELATED RISK FACTORS                                               GENDER SPECIFIC FACTORS  [ ] Edema in the lower extremities                       (1 Point)                  [ ] Pregnancy                                                     (1 Point)  [ ] Varicose veins                                               (1 Point)                  [ ] Post-partum < 6 weeks                                   (1 Point)             [x ] BMI > 25 Kg/m2                                            (1 Point)                  [ ] Hormonal therapy  or oral contraception          (1 Point)                 [ ] Sepsis (in the previous month)                        (1 Point)                  [ ] History of pregnancy complications                 (1 point)  [ ] Pneumonia or serious lung disease                                               [ ] Unexplained or recurrent                     (1 Point)           (in the previous month)                               (1 Point)  [ ] Abnormal pulmonary function test                     (1 Point)                 SURGERY RELATED RISK FACTORS (include planned surgeries)  [ ] Acute myocardial infarction                              (1 Point)                 [ ]  Section                                             (1 Point)  [ ] Congestive heart failure (in the previous month)  (1 Point)         [ ] Minor surgery                                                  (1 Point)   [ ] Inflammatory bowel disease                             (1 Point)                 [ ] Arthroscopic surgery                                        (2 Points)  [ ] Central venous access                                      (2 Points)                [ ] General surgery lasting more than 45 minutes   (2 Points)       [ x] Stroke (in the previous month)                          (5 Points)               [ ] Elective arthroplasty                                         (5 Points)            [ ] current or past malignancy                              (2 Points)                                                                                                       HEMATOLOGY RELATED FACTORS                                                 TRAUMA RELATED RISK FACTORS  [ ] Prior episodes of VTE                                     (3 Points)                [ ] Fracture of the hip, pelvis, or leg                       (5 Points)  [ ] Positive family history for VTE                         (3 Points)                 [ ] Acute spinal cord injury (in the previous month)  (5 Points)  [ ] Prothrombin 96950 A                                     (3 Points)                 [ ] Paralysis  (less than 1 month)                             (5 Points)  [ ] Factor V Leiden                                             (3 Points)                  [ ] Multiple Trauma within 1 month                        (5 Points)  [ ] Lupus anticoagulants                                     (3 Points)                                                           [ ] Anticardiolipin antibodies                               (3 Points)                                                       [ ] High homocysteine in the blood                      (3 Points)                                             [ ] Other congenital or acquired thrombophilia      (3 Points)                                                [ ] Heparin induced thrombocytopenia                  (3 Points)                                          Total Score [   7       ]    Risk:  Very low 0   Low 1 to 2   Moderate 3 to 4   High =5       VTE Prophylasix Recommednations:  [x ] mechanical pneumatic compression devices                                      [ ] contraindicated: _____________________  [ ] chemo prophylasix                                                                                   [ x] contraindicated __SAH___________________    **** HIGH LIKELIHOOD DVT PRESENT ON ADMISSION  [x ] (please order LE dopplers within 24 hours of admission)

## 2019-05-21 NOTE — ED PROVIDER NOTE - OBJECTIVE STATEMENT
53f w hx mild intermittent asthma here for sudden-onset headache. While vacuuming ~3 hrs ago started suddenly and has been constant since. Located in occiput w/o laterality, non-radiating, "heavy" in nature. Did not try any medications. No obvious alleviating or exacerbating factors. Never had similar before - denies hx headaches in general. Has had nausea and several episodes vomiting since onset. Also w "confused vision" though denies diplopia. No trauma. No fever or neck pain.

## 2019-05-21 NOTE — H&P ADULT - NSHPPHYSICALEXAM_GEN_ALL_CORE
PHYSICAL EXAMINATION:   T(C): 37 (05-21-19 @ 07:30), Max: 37 (05-21-19 @ 07:30)  HR: 111 (05-21-19 @ 07:30) (111 - 122)  BP: 140/91 (05-21-19 @ 07:30) (115/72 - 140/91)  RR: 17 (05-21-19 @ 07:30) (17 - 19)  SpO2: 98% (05-21-19 @ 07:30) (96% - 98%)  Wt(kg): --Height (cm): 162.56 (05-21 @ 07:25)  Weight (kg): 81.6 (05-21 @ 07:25)    General Examination:   AOx3, FC, PERRL, EOMI, no facial   5/5 throughout, no drift  SILT  no clonus

## 2019-05-21 NOTE — ED PROVIDER NOTE - CLINICAL SUMMARY MEDICAL DECISION MAKING FREE TEXT BOX
53 Y F with no pmhx found to have SAH at OSH here with no complaints at the moment, neuro sx aware, they would like CTA head and neck and CT non-con, will be admitted to NSICU. 53 Y F with no pmhx found to have SAH at OSH here with no complaints at the moment, neuro sx aware, they would like CTA head and neck and CT non-con, will be admitted to NSICU.    Attending MD Baez: 54 yo female with no PMH presented to [facility] with sudden onset of thunderclap headache and associated nausea and vomiting.  Had CT with SAH and transferred here for NS eval.  Seen here by NS and requesting CTA Head and neck and admit to NSICU.  Attending MD Baez: A & O x 3, NAD, HEENT WNL and no facial asymmetry; lungs CTAB, heart with reg rhythm without murmur; abdomen soft NTND; extremities with no edema; skin with no rashes, neuro exam non focal with no motor or sensory deficits. Plan:  pain control and admit to NSICU.

## 2019-05-21 NOTE — PROCEDURE NOTE - NSPOSTCAREGUIDE_GEN_A_CORE
Care for catheter as per unit/ICU protocols/Verbal/written post procedure instructions were given to patient/caregiver/Instructed patient/caregiver regarding signs and symptoms of infection/Keep the cast/splint/dressing clean and dry/Instructed patient/caregiver to follow-up with primary care physician

## 2019-05-21 NOTE — ED PROVIDER NOTE - PROGRESS NOTE DETAILS
Elizabeth Goldberger PGY-2: radiology called that pt w acute SAH, paged nsgy /82. Neuro intact. GCS 15. NeuroSx PA-C at bedside. JYOTI. [facility] and Dr. Yusuf (ED) accepted. 1g keppra requested and given. JYOTI.

## 2019-05-21 NOTE — PROGRESS NOTE ADULT - ASSESSMENT
HH1, MF1 Perimesencephalic SAH    NEURO:  Diagnosis: CTA Head negative, conventional angiogram  Treatment: OR vs. IR for aneurysm treatment  Seizure Prophylaxis: Levetiracetam until aneurysm treated  Delayed Cerebral Ischemia Management: Serial screening TCDs, euvolemia, nimodipine  Pain: PRN analgesics with Tylenol   Activity: [] OOB as tolerated [] Bedrest [] PT [] OT [] PMNR    PULM:  Incentive spirometry    CV:  SBP goal  TTE  Place Salima     RENAL:  Fluids:  NS @ 75   Parmar before Angio     GI:  Diet:  GI prophylaxis [x] not indicated [] PPI [] other:  Bowel regimen [x] colace [x] senna [] other:    ENDO:   Goal euglycemia (-180)    HEME/ONC:  VTE prophylaxis: [] SCDs [] chemoprophylaxis [x] hold chemoprophylaxis due to:  SAH   [] high risk of DVT/PE on admission due to:    ID: afebrile     SOCIAL/FAMILY:  [] awaiting [x] updated at bedside [] family meeting    CODE STATUS:  [x] Full Code [] DNR [] DNI [] Palliative/Comfort Care    DISPOSITION:  [x] ICU [] Stroke Unit [] Floor [] EMU [] RCU [] PCU    [x] Patient is at high risk of neurologic deterioration/death due to:  sezirues, herniation     Time seen:  1100  Time spent: 45 critical care minutes HH1, MF1 Perimesencephalic SAH    NEURO:  Diagnosis: CTA Head negative, conventional angiogram  Treatment: OR vs. IR for aneurysm treatment  Seizure Prophylaxis: Levetiracetam until aneurysm treated  Delayed Cerebral Ischemia Management: Serial screening TCDs, euvolemia, nimodipine  Pain: PRN analgesics with Tylenol   Activity: [] OOB as tolerated [] Bedrest [] PT [] OT [] PMNR    PULM:  Incentive spirometry    CV:  SBP goal  TTE  Place Salima     RENAL:  Fluids:  NS @ 75   Parmar before Angio     GI:  Diet:  GI prophylaxis [x] not indicated [] PPI [] other:  Bowel regimen [x] colace [x] senna [] other:    ENDO:   Goal euglycemia (-180)    HEME/ONC:  VTE prophylaxis: [] SCDs [] chemoprophylaxis [x] hold chemoprophylaxis due to:  SAH   [] high risk of DVT/PE on admission due to:    ID: afebrile     SOCIAL/FAMILY:  [] awaiting [x] updated at bedside [] family meeting    CODE STATUS:  [x] Full Code [] DNR [] DNI [] Palliative/Comfort Care    DISPOSITION:  [x] ICU [] Stroke Unit [] Floor [] EMU [] RCU [] PCU    [x] Patient is at high risk of neurologic deterioration/death due to:  sezirues, herniation     Time spent: 45 critical care minutes

## 2019-05-21 NOTE — H&P ADULT - ASSESSMENT
Felicia Garcia, 52 YO right handed female pmh asthma had sudden onset of WHOL while vacuuming last evening. States she had nausea and a brief period of diplopia which have both resolved. C/O severe headache mainly occipital, no neck pain. Denies focal motor or sensory loss, denies lethargy.HH1/MF1. CTH showed perimes heme. No ac/antiplt.    PLAN: CTH,CTA,NSCU,angio

## 2019-05-21 NOTE — CHART NOTE - NSCHARTNOTEFT_GEN_A_CORE
Interventional Neuro Radiology  Pre-Procedure Note PA-C    This is a 53 year old right hand dominant female pmh asthma had sudden onset of WHOL while vacuuming last evening. States she had nausea and a brief period of diplopia which have both resolved. C/O severe headache mainly occipital, no neck pain. Denies focal motor or sensory loss, denies lethargy.HH1/MF1. Ct scan revealed a subarachnoid hemorrhage. Patient is transported to Neuro IR for a selective cerebral angiography to determine a source for the hemorrhage.    Allergies: No Known Allergies  PMHX: Anemia, Asthma  PSHX: no significant past surgical history  Social History:   FAMILY HISTORY:    Current Medications: acetaminophen  tablet 650 milliGRAM(s) Oral every 6 hours PRN  chlorhexidine 4% Liquid 1 Application(s) Topical   docusate sodium 100 milliGRAM(s) Oral two times a day  levETIRAcetam  IVPB 500 milliGRAM(s) IV Intermittent every 12 hours  niCARdipine Infusion 5 mG/Hr IV Continuous   niMODipine 60 milliGRAM(s) Oral every 4 hours  oxyCODONE    IR 5 milliGRAM(s) Oral every 4 hours PRN  oxyCODONE    IR 10 milliGRAM(s) Oral every 4 hours PRN  senna 2 Tablet(s) Oral at bedtime  sodium chloride 0.9%. 1000 milliLiter(s) IV Continuous     CBC                        12.5   11.8  )-----------( 333      ( 21 May 2019 11:55 )             37.3       BMP    139  |  100  |  6<L>  ----------------------------<  134<H>  3.7   |  25  |  0.54      Blood Bank: B negative       Assessment/Plan:   This is a 53 year old right hand dominant female   Procedure, goals, risks, benefits and alternatives were discussed with patient and patient's family. All questions were answered. Risks include but are not limited to stroke, vessel injury, hemorrhage, and or right groin hematoma. Patient demonstrates understanding of all risks involved with this procedure and wishes to continue.  Appropriate content was obtained from patient and consent is in the patient's chart. Interventional Neuro Radiology  Pre-Procedure Note PA-C    This is a 53 year old right hand dominant female pmh asthma had sudden onset of WHOL while vacuuming last evening. States she had nausea and a brief period of diplopia which have both resolved. C/O severe headache mainly occipital, no neck pain. Denies focal motor or sensory loss, denies lethargy.HH1/MF1. Ct scan revealed a subarachnoid hemorrhage. Patient is transported to Neuro IR for a selective cerebral angiography to determine a source for the hemorrhage.    Allergies: No Known Allergies  PMHX: anemia, asthma  PSHX: no significant past surgical history  Social History:  , non-smoker   FAMILY HISTORY: non-contributory     Current Medications: acetaminophen tablet 650 milliGRAM(s) Oral every 6 hours PRN  chlorhexidine 4% Liquid 1 Application(s) Topical   docusate sodium 100 milliGRAM(s) Oral two times a day  levETIRAcetam  IVPB 500 milliGRAM(s) IV Intermittent every 12 hours  niCARdipine Infusion 5 mG/Hr IV Continuous   niMODipine 60 milliGRAM(s) Oral every 4 hours  oxyCODONE    IR 5 milliGRAM(s) Oral every 4 hours PRN  oxyCODONE    IR 10 milliGRAM(s) Oral every 4 hours PRN  senna 2 Tablet(s) Oral at bedtime  sodium chloride 0.9%. 1000 milliLiter(s) IV Continuous     CBC                        12.5   11.8  )-----------( 333      ( 21 May 2019 11:55 )             37.3       BMP    139  |  100  |  6<L>  ----------------------------<  134<H>  3.7   |  25  |  0.54      Blood Bank: B negative       Assessment/Plan:   This is a 53 year old right hand dominant female with an acute subarachnoid hemorrhage, who is transported to Neuro IR for a selective cerebral angiography to determine a source for hemorrhage. Procedure, goals, risks, benefits and alternatives were discussed with patient and patient's family. All questions were answered. Risks include but are not limited to stroke, vessel injury, hemorrhage, and or right groin hematoma. Patient demonstrates understanding of all risks involved with this procedure and wishes to continue. Appropriate content was obtained from patient and consent is in the patient's chart.

## 2019-05-21 NOTE — ED PROVIDER NOTE - CRITICAL CARE INDICATION, MLM
patient was critically ill... Patient was critically ill with a high probability of imminent or life threatening deterioration. Spontaneous SAH.

## 2019-05-21 NOTE — DISCHARGE NOTE NURSING/CASE MANAGEMENT/SOCIAL WORK - NSDCDPATPORTLINK_GEN_ALL_CORE
You can access the [facility] Portal, offered by [facility], by registering with the following website: [email or web address]

## 2019-05-21 NOTE — ED PROVIDER NOTE - CLINICAL SUMMARY MEDICAL DECISION MAKING FREE TEXT BOX
53f here for sudden-onset severe headache a/w nausea, vomiting and visual changes. Appears well w no focal deficits. Possible migraine though described hx c/f possible bleed. Will check cth, treat sx, reassess

## 2019-05-21 NOTE — PROCEDURE NOTE - NSPROCDETAILS_GEN_ALL_CORE
connected to a pressurized flush line/positive blood return obtained via catheter/sutured in place/all materials/supplies accounted for at end of procedure/location identified, draped/prepped, sterile technique used, needle inserted/introduced/Seldinger technique

## 2019-05-21 NOTE — ED PROVIDER NOTE - OBJECTIVE STATEMENT
53 Y F with no PMHx tx from [facility] arrived there at 11pm for HA that started 3 hours prior, sudden onset thunderclap found SAH. At [facility] for 1 L NS, reglan, lidocaine patch, received keppra. Per EMS tachycardia, /70.  gave ASA prior to original EMS . She was accepted by John Young. pt presents here only complaining of mild HA and being thirsty. She denies nausea, fever, LOC, confusion, numbness, weakness, tingling. 53 Y F with no PMHx tx from [facility] arrived there at 11pm for HA that started 3 hours prior, sudden onset thunderclap found SAH. At [facility] for 1 L NS, reglan, lidocaine patch, received keppra. Per EMS tachycardia, /70.  gave ASA prior to original EMS . She was accepted by John Young. pt presents here only complaining of mild HA and being thirsty. She denies nausea, fever, LOC, confusion, numbness, weakness, tingling. Pt chart not crossing over from [facility], outside MRN is [id number]. Pt had normal Cr there. closed/non tender

## 2019-05-21 NOTE — ED ADULT NURSE NOTE - CHIEF COMPLAINT QUOTE
Pt BIBA for HA starting 30 mins ago associated with n/v.  neuro intact.  HTN in triage.  denies and pmhx.    Pt now with c/o of double vision/blurry vision/ difficulty breathing.  Respirations even unlabored at this time.  EKG to be completed.

## 2019-05-21 NOTE — ED ADULT NURSE REASSESSMENT NOTE - NS ED NURSE REASSESS COMMENT FT1
Pts BP elevated MD Solitario aware and paged Neuro Surg, pt to be placed on cardene infusion. Additional IV placed in pts right hand. Report given to 4 richie Sorenson RN

## 2019-05-21 NOTE — CONSULT NOTE ADULT - SUBJECTIVE AND OBJECTIVE BOX
52 YO right handed female had sudden onset of WHOL while vacuuming last evening. States she had nausea and a brief period of diplopia which have both resolved. C/O severe headache mainly occipital, no neck pain. Denies focal motor or sensory loss, denies lethargy.    WDWN female in NAD  Vital Signs Last 24 Hrs  T(C): 37.1 (21 May 2019 05:57), Max: 37.1 (21 May 2019 05:57)  T(F): 98.7 (21 May 2019 05:57), Max: 98.7 (21 May 2019 05:57)  HR: 102 (21 May 2019 05:57) (102 - 107)  BP: 119/82 (21 May 2019 05:57) (119/82 - 160/110)  BP(mean): --  RR: 18 (21 May 2019 05:57) (18 - 18)  SpO2: 98% (21 May 2019 05:57) (98% - 100%)    AAO X 3  PERRLA, EOMI  CN 2-12 grossly intact  ALVARENGA strength 5/5, no drift  SILT    Noncontrast head CT: Subarachnoid hemorrhage involving the basal cisterns with mild early hydrocephalus

## 2019-05-22 LAB
ANION GAP SERPL CALC-SCNC: 12 MMOL/L — SIGNIFICANT CHANGE UP (ref 5–17)
BUN SERPL-MCNC: 7 MG/DL — SIGNIFICANT CHANGE UP (ref 7–23)
CALCIUM SERPL-MCNC: 9.3 MG/DL — SIGNIFICANT CHANGE UP (ref 8.4–10.5)
CHLORIDE SERPL-SCNC: 100 MMOL/L — SIGNIFICANT CHANGE UP (ref 96–108)
CO2 SERPL-SCNC: 24 MMOL/L — SIGNIFICANT CHANGE UP (ref 22–31)
CREAT SERPL-MCNC: 0.61 MG/DL — SIGNIFICANT CHANGE UP (ref 0.5–1.3)
GLUCOSE SERPL-MCNC: 131 MG/DL — HIGH (ref 70–99)
HCT VFR BLD CALC: 37.5 % — SIGNIFICANT CHANGE UP (ref 34.5–45)
HGB BLD-MCNC: 12.3 G/DL — SIGNIFICANT CHANGE UP (ref 11.5–15.5)
MAGNESIUM SERPL-MCNC: 2 MG/DL — SIGNIFICANT CHANGE UP (ref 1.6–2.6)
MCHC RBC-ENTMCNC: 26.2 PG — LOW (ref 27–34)
MCHC RBC-ENTMCNC: 32.7 GM/DL — SIGNIFICANT CHANGE UP (ref 32–36)
MCV RBC AUTO: 80.1 FL — SIGNIFICANT CHANGE UP (ref 80–100)
PHOSPHATE SERPL-MCNC: 4.1 MG/DL — SIGNIFICANT CHANGE UP (ref 2.5–4.5)
PLATELET # BLD AUTO: 329 K/UL — SIGNIFICANT CHANGE UP (ref 150–400)
POTASSIUM SERPL-MCNC: 4 MMOL/L — SIGNIFICANT CHANGE UP (ref 3.5–5.3)
POTASSIUM SERPL-SCNC: 4 MMOL/L — SIGNIFICANT CHANGE UP (ref 3.5–5.3)
RBC # BLD: 4.67 M/UL — SIGNIFICANT CHANGE UP (ref 3.8–5.2)
RBC # FLD: 15.6 % — HIGH (ref 10.3–14.5)
SODIUM SERPL-SCNC: 136 MMOL/L — SIGNIFICANT CHANGE UP (ref 135–145)
WBC # BLD: 11.1 K/UL — HIGH (ref 3.8–10.5)
WBC # FLD AUTO: 11.1 K/UL — HIGH (ref 3.8–10.5)

## 2019-05-22 PROCEDURE — 72156 MRI NECK SPINE W/O & W/DYE: CPT | Mod: 26

## 2019-05-22 PROCEDURE — 70553 MRI BRAIN STEM W/O & W/DYE: CPT | Mod: 26

## 2019-05-22 PROCEDURE — 99291 CRITICAL CARE FIRST HOUR: CPT

## 2019-05-22 RX ORDER — DEXAMETHASONE 0.5 MG/5ML
4 ELIXIR ORAL EVERY 6 HOURS
Refills: 0 | Status: COMPLETED | OUTPATIENT
Start: 2019-05-22 | End: 2019-05-23

## 2019-05-22 RX ORDER — SODIUM CHLORIDE 9 MG/ML
1000 INJECTION INTRAMUSCULAR; INTRAVENOUS; SUBCUTANEOUS
Refills: 0 | Status: DISCONTINUED | OUTPATIENT
Start: 2019-05-22 | End: 2019-05-22

## 2019-05-22 RX ORDER — ALBUTEROL 90 UG/1
1 AEROSOL, METERED ORAL EVERY 4 HOURS
Refills: 0 | Status: DISCONTINUED | OUTPATIENT
Start: 2019-05-22 | End: 2019-05-23

## 2019-05-22 RX ORDER — ONDANSETRON 8 MG/1
4 TABLET, FILM COATED ORAL ONCE
Refills: 0 | Status: COMPLETED | OUTPATIENT
Start: 2019-05-22 | End: 2019-05-22

## 2019-05-22 RX ORDER — DEXAMETHASONE 0.5 MG/5ML
4 ELIXIR ORAL EVERY 8 HOURS
Refills: 0 | Status: DISCONTINUED | OUTPATIENT
Start: 2019-05-23 | End: 2019-05-23

## 2019-05-22 RX ORDER — METOCLOPRAMIDE HCL 10 MG
10 TABLET ORAL ONCE
Refills: 0 | Status: COMPLETED | OUTPATIENT
Start: 2019-05-22 | End: 2019-05-22

## 2019-05-22 RX ORDER — DEXAMETHASONE 0.5 MG/5ML
ELIXIR ORAL
Refills: 0 | Status: DISCONTINUED | OUTPATIENT
Start: 2019-05-22 | End: 2019-05-23

## 2019-05-22 RX ADMIN — OXYCODONE HYDROCHLORIDE 10 MILLIGRAM(S): 5 TABLET ORAL at 05:48

## 2019-05-22 RX ADMIN — Medication 4 MILLIGRAM(S): at 12:46

## 2019-05-22 RX ADMIN — Medication 100 MILLIGRAM(S): at 06:46

## 2019-05-22 RX ADMIN — ONDANSETRON 4 MILLIGRAM(S): 8 TABLET, FILM COATED ORAL at 05:00

## 2019-05-22 RX ADMIN — OXYCODONE HYDROCHLORIDE 10 MILLIGRAM(S): 5 TABLET ORAL at 01:13

## 2019-05-22 RX ADMIN — OXYCODONE HYDROCHLORIDE 10 MILLIGRAM(S): 5 TABLET ORAL at 06:18

## 2019-05-22 RX ADMIN — OXYCODONE HYDROCHLORIDE 10 MILLIGRAM(S): 5 TABLET ORAL at 09:53

## 2019-05-22 RX ADMIN — OXYCODONE HYDROCHLORIDE 5 MILLIGRAM(S): 5 TABLET ORAL at 23:51

## 2019-05-22 RX ADMIN — Medication 100 MILLIGRAM(S): at 18:08

## 2019-05-22 RX ADMIN — Medication 4 MILLIGRAM(S): at 23:21

## 2019-05-22 RX ADMIN — OXYCODONE HYDROCHLORIDE 10 MILLIGRAM(S): 5 TABLET ORAL at 18:39

## 2019-05-22 RX ADMIN — OXYCODONE HYDROCHLORIDE 5 MILLIGRAM(S): 5 TABLET ORAL at 23:21

## 2019-05-22 RX ADMIN — OXYCODONE HYDROCHLORIDE 10 MILLIGRAM(S): 5 TABLET ORAL at 00:43

## 2019-05-22 RX ADMIN — Medication 4 MILLIGRAM(S): at 18:08

## 2019-05-22 RX ADMIN — OXYCODONE HYDROCHLORIDE 10 MILLIGRAM(S): 5 TABLET ORAL at 18:09

## 2019-05-22 RX ADMIN — OXYCODONE HYDROCHLORIDE 10 MILLIGRAM(S): 5 TABLET ORAL at 10:30

## 2019-05-22 RX ADMIN — Medication 10 MILLIGRAM(S): at 06:00

## 2019-05-22 NOTE — PHYSICAL THERAPY INITIAL EVALUATION ADULT - ADDITIONAL COMMENTS
as per pt, resides in a  with spouse, +16-18 stairs to enter, one floor set up, PTA, pt amb (I), (I) with ADLs, R hand dominant.

## 2019-05-22 NOTE — PHYSICAL THERAPY INITIAL EVALUATION ADULT - PLANNED THERAPY INTERVENTIONS, PT EVAL
transfer training/stairs: GOAL: patient will be able to negotiated 18 stairs (I) with one HR in 2 weeks/bed mobility training/gait training

## 2019-05-22 NOTE — PROGRESS NOTE ADULT - ASSESSMENT
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 Perimesencephalic SAH    NEURO:  Diagnosis: CTA Head negative, conventional angiogram- NO aneurysn or AVM  Seizure Prophylaxis: Levetiracetam until aneurysm treated  Delayed Cerebral Ischemia Management: Serial screening TCDs, euvolemia, nimodipine  Pain: PRN analgesics with Tylenol   Activity: [X] OOB as tolerated [] Bedrest [X] PT X OT     PULM:  Incentive spirometry  maintain sats > 92%    CV:  SBP goal- 100-< 150  TTE  Place Salima- D/C     RENAL:  Fluids:  NS @ 75   Parmar- D/C    GI:  Diet: Reg diet  GI prophylaxis [x] not indicated   Bowel regimen [x] colace [x] senna [] other:    ENDO:   Goal euglycemia (-180)    HEME/ONC:  VTE prophylaxis: [X] SCDs [X] chemoprophylaxis - lovenox      ID: afebrile     SOCIAL/FAMILY:   [x] updated at bedside     CODE STATUS:  [x] Full Code     DISPOSITION:  [x] ICU     [x] Patient is at high risk of neurologic deterioration/death due to:  sezirues, herniation     Time spent: 40 critical care minutes 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 Perimesencephalic SAH    NEURO:  Diagnosis: CTA Head negative, conventional angiogram- NO aneurysn or AVM  Seizure Prophylaxis: Levetiracetam until aneurysm treated  Delayed Cerebral Ischemia Management: Serial screening TCDs, euvolemia, nimodipine  Pain: PRN analgesics with Tylenol   Activity: [X] OOB as tolerated [] Bedrest [X] PT X OT     PULM:  Incentive spirometry  maintain sats > 92%    CV:  SBP goal- 100-< 150  TTE  Place Salima- D/C     RENAL:  Fluids:  NS @ 75   Parmar- D/C    GI:  Diet: CCD diet  GI prophylaxis [x] not indicated   Bowel regimen [x] colace [x] senna [] other:    ENDO: Prediabetic  Goal euglycemia (-180)    HEME/ONC:  VTE prophylaxis: [X] SCDs [X] chemoprophylaxis - lovenox      ID: afebrile     SOCIAL/FAMILY:   [x] updated at bedside     CODE STATUS:  [x] Full Code     DISPOSITION:  [x] ICU     [x] Patient is at high risk of neurologic deterioration/death due to:  sezirues, herniation     Time spent: 40 critical care minutes 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   Risk for Delayed Cerebral Ischemia low   Pain: PRN analgesics with Tylenol   Activity: [X] OOB as tolerated  [X] PT      PULM: Asthma   Incentive spirometry  maintain sats > 92%  Bronchodilators    CV:  SBP goal- 100-< 150  TTE  Place Salima- D/C     RENAL:  Fluids:  NS @ 75   Parmar- D/C    GI:  Diet: CCD diet  GI prophylaxis [x] not indicated   Bowel regimen [x] colace [x] senna [] other:    ENDO: Prediabetic  Goal euglycemia (-180)    HEME/ONC:  VTE prophylaxis: [X] SCDs [X] chemoprophylaxis - lovenox      ID: afebrile     SOCIAL/FAMILY:   [x] updated at bedside     CODE STATUS:  [x] Full Code     DISPOSITION:  [x] ICU     [x] Patient is at high risk of neurologic deterioration/death due to:  sezirues, herniation     Time spent: 40 critical care minutes 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 Perimesencephalic SAH    NEURO: Perimesencephalic SAH/ Headache  Decadron trial for HA - taper if effective   CTA Head negative, conventional angiogram- NO aneurysn or AVM  No need for seizure prophylaxsis or nimodipine with neg angio.   Risk for Delayed Cerebral Ischemia low   Pain: PRN analgesics with Tylenol   MRI +/- contrast brain + cervical spine   Activity: [X] OOB as tolerated  [X] PT      PULM: Asthma   Incentive spirometry  maintain sats > 92%  Bronchodilators prn wheezing    CV:  SBP goal- 100-< 150  TTE  Place [place]- D/C     RENAL:  Fluids:  NS @ 50 since poor po intake   Parmar- D/C    GI: IBD  Diet: CCD diet  GI prophylaxis [x] not indicated   Bowel regimen [x] colace [x] senna     ENDO: Prediabetic  Goal euglycemia (-180)    HEME/ONC:  Hold Fe sulfate - will restart as OPT high constipation risk - hx of constipation and limited mobility  VTE prophylaxis: [X] SCDs [X] chemoprophylaxis - lovenox      ID: afebrile     SOCIAL/FAMILY:   [x] updated at bedside     CODE STATUS:  [x] Full Code     DISPOSITION:  [x] ICU     [x] Patient is at high risk of neurologic deterioration/death due to:  sezirues, herniation     Time spent: 40 critical care minutes

## 2019-05-22 NOTE — PROGRESS NOTE ADULT - SUBJECTIVE AND OBJECTIVE BOX
Felicia Swartzaud, 52 YO right handed female pmh asthma had sudden onset of WHOL while vacuuming. States she had nausea and a brief period of diplopia which have both resolved. C/O severe headache mainly occipital, no neck pain. Denies focal motor or sensory loss, denies lethargy.HH1/MF1.     5/21- Angio neg for aneurysm or AVM    CTH showed perimesencephalic heme. No ac/antiplt.     On admission, the patient was:  Mcwilliams-Orozco: 1  modified Chavez: 1    MEDICATIONS  (STANDING):  chlorhexidine 4% Liquid 1 Application(s) Topical <User Schedule>  docusate sodium 100 milliGRAM(s) Oral two times a day  senna 2 Tablet(s) Oral at bedtime    MEDICATIONS  (PRN):  acetaminophen   Tablet .. 650 milliGRAM(s) Oral every 6 hours PRN Temp greater or equal to 38C (100.4F), Mild Pain (1 - 3)  oxyCODONE    IR 5 milliGRAM(s) Oral every 4 hours PRN Moderate Pain (4 - 6)  oxyCODONE    IR 10 milliGRAM(s) Oral every 4 hours PRN Severe Pain (7 - 10)    ICU Vital Signs Last 24 Hrs  T(C): 37.1 (22 May 2019 07:00), Max: 37.1 (21 May 2019 11:00)  HR: 98 (22 May 2019 07:00) (96 - 131)  BP: 120/83 (22 May 2019 07:00) (109/84 - 156/96)  ABP: 112/91 (21 May 2019 22:00) (103/85 - 149/82)  RR: 11 (22 May 2019 07:00) (10 - 33)  SpO2: 91% (22 May 2019 07:00) (91% - 99%)               12.3   11.1  )-----------( 329      ( 22 May 2019 00:54 )             37.5     05-22    136  |  100  |  7   ----------------------------<  131<H>  4.0   |  24  |  0.61    Ca    9.3      22 May 2019 00:54  Phos  4.1     05-22  Mg     2.0     05-22    TPro  7.7  /  Alb  4.4  /  TBili  0.3  /  DBili  x   /  AST  20  /  ALT  34  /  AlkPhos  138<H>  05-21    PT/INR - ( 21 May 2019 11:55 )   PT: 12.0 sec;   INR: 1.05 ratio    PTT - ( 21 May 2019 11:55 )  PTT:34.0 sec    < from: CT Head No Cont (05.21.19 @ 08:42) >  Periventricular white matter lucency is seen. This is nonspecific though   could be related to chronic microvascular ischemic changes especially in   the setting of diabetes or hypertension. Please correlate clinically.    Abnormal high attenuation seen in the interpeduncular cistern as well as   the right perimesencephalic cistern. This is compatible with area of   acute subarachnoid hemorrhage.    No significant shift or herniation is seen.  Evaluation of the osseous structures with appropriate window appears   unremarkable  Small right maxillary polyp versus retention cyst is seen.  Both mastoid and middle ear regions appear clear.  Both distal common carotid, proximal internal and external carotid   arteries appear normal as well as both vertebral arteries. No significant   stenosis is seen.  Evaluation of both distal internal carotid, anterior cerebral, middle   cerebral, basilar and posterior cerebral arteries appear normal without   evidence of an aneurysm or significant stenosis.  The dural venous sinuses demonstrate normal enhancement with no evidence   of venous sinus thrombosis  Evaluation of the soft tissue neck region appears unremarkable  The visualized portions of both lung apices appear clear.  Impression: Unremarkable CTA of neck and [facility] [place]             Physical Exam  General:  calm  HEENT:  MMM  Neuro:  awake, alert, oriented x 3, follows commands, face symmetric  Pupils Equal and reactive, RUE 5/5,  LUE 5/5,  RLE 5/5,  LLE 5/5, no drift   Cards:  s1, s2 RRR  Respiratory:  lungs clear b/l   Adomen:  soft  Extremities:  no edema  Skin:  warm/dry Felicia Swartzaud, 54 YO right handed female pmh asthma had sudden onset of WHOL while vacuuming. States she had nausea and a brief period of diplopia which have both resolved. C/O severe headache mainly occipital, no neck pain. Denies focal motor or sensory loss, denies lethargy.HH1/MF1.     5/21- Angio neg for aneurysm or AVM    CTH showed perimesencephalic heme. No ac/antiplt.     On admission, the patient was:  Mcwilliams-Orozco: 1  modified Cahvez: 1    MEDICATIONS  (STANDING):  chlorhexidine 4% Liquid 1 Application(s) Topical <User Schedule>  docusate sodium 100 milliGRAM(s) Oral two times a day  senna 2 Tablet(s) Oral at bedtime    MEDICATIONS  (PRN):  acetaminophen   Tablet .. 650 milliGRAM(s) Oral every 6 hours PRN Temp greater or equal to 38C (100.4F), Mild Pain (1 - 3)  oxyCODONE    IR 5 milliGRAM(s) Oral every 4 hours PRN Moderate Pain (4 - 6)  oxyCODONE    IR 10 milliGRAM(s) Oral every 4 hours PRN Severe Pain (7 - 10)    ICU Vital Signs Last 24 Hrs  T(C): 37.1 (22 May 2019 07:00), Max: 37.1 (21 May 2019 11:00)  HR: 98 (22 May 2019 07:00) (96 - 131)  BP: 120/83 (22 May 2019 07:00) (109/84 - 156/96)  ABP: 112/91 (21 May 2019 22:00) (103/85 - 149/82)  RR: 11 (22 May 2019 07:00) (10 - 33)  SpO2: 91% (22 May 2019 07:00) (91% - 99%)               12.3   11.1  )-----------( 329      ( 22 May 2019 00:54 )             37.5     05-22    136  |  100  |  7   ----------------------------<  131<H>  4.0   |  24  |  0.61    Ca    9.3      22 May 2019 00:54  Phos  4.1     05-22  Mg     2.0     05-22    TPro  7.7  /  Alb  4.4  /  TBili  0.3  /  DBili  x   /  AST  20  /  ALT  34  /  AlkPhos  138<H>  05-21    Chol- 194, LDL - 123  HGBA1C- 6.3    PT/INR - ( 21 May 2019 11:55 )   PT: 12.0 sec;   INR: 1.05 ratio    PTT - ( 21 May 2019 11:55 )  PTT:34.0 sec    < from: CT Head No Cont (05.21.19 @ 08:42) >  Periventricular white matter lucency is seen. This is nonspecific though   could be related to chronic microvascular ischemic changes especially in   the setting of diabetes or hypertension. Please correlate clinically.    Abnormal high attenuation seen in the interpeduncular cistern as well as   the right perimesencephalic cistern. This is compatible with area of   acute subarachnoid hemorrhage.    No significant shift or herniation is seen.  Evaluation of the osseous structures with appropriate window appears   unremarkable  Small right maxillary polyp versus retention cyst is seen.  Both mastoid and middle ear regions appear clear.  Both distal common carotid, proximal internal and external carotid   arteries appear normal as well as both vertebral arteries. No significant   stenosis is seen.  Evaluation of both distal internal carotid, anterior cerebral, middle   cerebral, basilar and posterior cerebral arteries appear normal without   evidence of an aneurysm or significant stenosis.  The dural venous sinuses demonstrate normal enhancement with no evidence   of venous sinus thrombosis  Evaluation of the soft tissue neck region appears unremarkable  The visualized portions of both lung apices appear clear.  Impression: Unremarkable CTA of neck and [place] [facility] [place]             Physical Exam  General:  calm  HEENT:  MMM  Neuro:  awake, alert, oriented x 3, follows commands, face symmetric  Pupils Equal and reactive, RUE 5/5,  LUE 5/5,  RLE 5/5,  LLE 5/5, no drift   Cards:  s1, s2 RRR  Respiratory:  lungs clear b/l   Adomen:  soft  Extremities:  no edema  Skin:  warm/dry Felicia Swartzaud, 54 YO right handed female pmh asthma had sudden onset of WHOL while vacuuming. States she had nausea and a brief period of diplopia which have both resolved. C/O severe headache mainly occipital, no neck pain. Denies focal motor or sensory loss, denies lethargy.HH1/MF1.     5/21- Angio neg for aneurysm or AVM    CTH showed perimesencephalic heme. No ac/antiplt.     On admission, the patient was:  Mcwilliams-Orozco: 1  modified Chavez: 1    MEDICATIONS  (STANDING):  chlorhexidine 4% Liquid 1 Application(s) Topical <User Schedule>  docusate sodium 100 milliGRAM(s) Oral two times a day  senna 2 Tablet(s) Oral at bedtime    MEDICATIONS  (PRN):  acetaminophen   Tablet .. 650 milliGRAM(s) Oral every 6 hours PRN Temp greater or equal to 38C (100.4F), Mild Pain (1 - 3)  oxyCODONE    IR 5 milliGRAM(s) Oral every 4 hours PRN Moderate Pain (4 - 6)  oxyCODONE    IR 10 milliGRAM(s) Oral every 4 hours PRN Severe Pain (7 - 10)      ICU Vital Signs Last 24 Hrs  T(C): 37.1 (22 May 2019 07:00), Max: 37.1 (21 May 2019 11:00)  HR: 98 (22 May 2019 07:00) (96 - 131)  BP: 120/83 (22 May 2019 07:00) (109/84 - 156/96)  ABP: 112/91 (21 May 2019 22:00) (103/85 - 149/82)  RR: 11 (22 May 2019 07:00) (10 - 33)  SpO2: 91% (22 May 2019 07:00) (91% - 99%)               12.3   11.1  )-----------( 329      ( 22 May 2019 00:54 )             37.5     05-22    136  |  100  |  7   ----------------------------<  131<H>  4.0   |  24  |  0.61    Ca    9.3      22 May 2019 00:54  Phos  4.1     05-22  Mg     2.0     05-22    TPro  7.7  /  Alb  4.4  /  TBili  0.3  /  DBili  x   /  AST  20  /  ALT  34  /  AlkPhos  138<H>  05-21    Chol- 194, LDL - 123  HGBA1C- 6.3    PT/INR - ( 21 May 2019 11:55 )   PT: 12.0 sec;   INR: 1.05 ratio    PTT - ( 21 May 2019 11:55 )  PTT:34.0 sec       CT Head No Cont (05.21.19 @ 08:42) >  Periventricular white matter lucency is seen. This is nonspecific though   could be related to chronic microvascular ischemic changes especially in   the setting of diabetes or hypertension. Please correlate clinically.    Abnormal high attenuation seen in the interpeduncular cistern as well as   the right perimesencephalic cistern. This is compatible with area of   acute subarachnoid hemorrhage.    No significant shift or herniation is seen.  Evaluation of the osseous structures with appropriate window appears   unremarkable  Small right maxillary polyp versus retention cyst is seen.  Both mastoid and middle ear regions appear clear.  Both distal common carotid, proximal internal and external carotid   arteries appear normal as well as both vertebral arteries. No significant   stenosis is seen.  Evaluation of both distal internal carotid, anterior cerebral, middle   cerebral, basilar and posterior cerebral arteries appear normal without   evidence of an aneurysm or significant stenosis.  The dural venous sinuses demonstrate normal enhancement with no evidence   of venous sinus thrombosis  Evaluation of the soft tissue neck region appears unremarkable  The visualized portions of both lung apices appear clear.  Impression: Unremarkable CTA of neck and [facility] [place]             Physical Exam  General:  calm  HEENT:  MMM  Neuro:  awake, alert, oriented x 3, follows commands, face symmetric  Pupils Equal and reactive, RUE 5/5,  LUE 5/5,  RLE 5/5,  LLE 5/5, no drift   Cards:  s1, s2 RRR  Respiratory:  lungs clear b/l   Adomen:  soft  Extremities:  no edema  Skin:  warm/dry Felicia Swartzaud, 54 YO right handed female pmh  intermittent asthma, Fe def anemia, Irritable bowel Dz ( no Meds just stool softeners) had sudden onset of WHOL while vacuuming. States she had nausea and a brief period of diplopia which have both resolved. C/O severe headache mainly occipital, no neck pain. Denies focal motor or sensory loss, denies lethargy.HH1/MF1.     5/21- Angio neg for aneurysm or AVM    CTH showed perimesencephalic heme. No ac/antiplt.     On admission, the patient was:  Mcwilliams-Orozco: 1  modified Chavez: 1    MEDICATIONS  (STANDING):  chlorhexidine 4% Liquid 1 Application(s) Topical <User Schedule>  docusate sodium 100 milliGRAM(s) Oral two times a day  senna 2 Tablet(s) Oral at bedtime    MEDICATIONS  (PRN):  acetaminophen   Tablet .. 650 milliGRAM(s) Oral every 6 hours PRN Temp greater or equal to 38C (100.4F), Mild Pain (1 - 3)  oxyCODONE    IR 5 milliGRAM(s) Oral every 4 hours PRN Moderate Pain (4 - 6)  oxyCODONE    IR 10 milliGRAM(s) Oral every 4 hours PRN Severe Pain (7 - 10)      ICU Vital Signs Last 24 Hrs  T(C): 37.1 (22 May 2019 07:00), Max: 37.1 (21 May 2019 11:00)  HR: 98 (22 May 2019 07:00) (96 - 131)  BP: 120/83 (22 May 2019 07:00) (109/84 - 156/96)  ABP: 112/91 (21 May 2019 22:00) (103/85 - 149/82)  RR: 11 (22 May 2019 07:00) (10 - 33)  SpO2: 91% (22 May 2019 07:00) (91% - 99%)               12.3   11.1  )-----------( 329      ( 22 May 2019 00:54 )             37.5     05-22    136  |  100  |  7   ----------------------------<  131<H>  4.0   |  24  |  0.61    Ca    9.3      22 May 2019 00:54  Phos  4.1     05-22  Mg     2.0     05-22    TPro  7.7  /  Alb  4.4  /  TBili  0.3  /  DBili  x   /  AST  20  /  ALT  34  /  AlkPhos  138<H>  05-21    Chol- 194, LDL - 123  HGBA1C- 6.3    PT/INR - ( 21 May 2019 11:55 )   PT: 12.0 sec;   INR: 1.05 ratio    PTT - ( 21 May 2019 11:55 )  PTT:34.0 sec       CT Head No Cont (05.21.19 @ 08:42) >  Periventricular white matter lucency is seen. This is nonspecific though   could be related to chronic microvascular ischemic changes especially in   the setting of diabetes or hypertension. Please correlate clinically.    Abnormal high attenuation seen in the interpeduncular cistern as well as   the right perimesencephalic cistern. This is compatible with area of   acute subarachnoid hemorrhage.    No significant shift or herniation is seen.  Evaluation of the osseous structures with appropriate window appears   unremarkable  Small right maxillary polyp versus retention cyst is seen.  Both mastoid and middle ear regions appear clear.  Both distal common carotid, proximal internal and external carotid   arteries appear normal as well as both vertebral arteries. No significant   stenosis is seen.  Evaluation of both distal internal carotid, anterior cerebral, middle   cerebral, basilar and posterior cerebral arteries appear normal without   evidence of an aneurysm or significant stenosis.  The dural venous sinuses demonstrate normal enhancement with no evidence   of venous sinus thrombosis  Evaluation of the soft tissue neck region appears unremarkable  The visualized portions of both lung apices appear clear.  Impression: Unremarkable CTA of neck and [place] [facility] [place]             Physical Exam  General:  calm  HEENT:  MMM  Neuro:  awake, alert, oriented x 3, follows commands, face symmetric  Pupils Equal and reactive, RUE 5/5,  LUE 5/5,  RLE 5/5,  LLE 5/5, no drift   Cards:  s1, s2 RRR  Respiratory:  lungs clear b/l   Adomen:  soft  Extremities:  no edema  Skin:  warm/dry

## 2019-05-22 NOTE — PHYSICAL THERAPY INITIAL EVALUATION ADULT - PERTINENT HX OF CURRENT PROBLEM, REHAB EVAL
53yoF with asthma had sudden onset of WHOL, found possible SAH, CT Angio (-), pending MRI, ECG 5/21, tachy

## 2019-05-23 ENCOUNTER — TRANSCRIPTION ENCOUNTER (OUTPATIENT)
Age: 54
End: 2019-05-23

## 2019-05-23 VITALS
RESPIRATION RATE: 18 BRPM | OXYGEN SATURATION: 95 % | DIASTOLIC BLOOD PRESSURE: 80 MMHG | TEMPERATURE: 99 F | SYSTOLIC BLOOD PRESSURE: 131 MMHG | HEART RATE: 87 BPM

## 2019-05-23 LAB
ANION GAP SERPL CALC-SCNC: 17 MMOL/L — SIGNIFICANT CHANGE UP (ref 5–17)
BUN SERPL-MCNC: 11 MG/DL — SIGNIFICANT CHANGE UP (ref 7–23)
CALCIUM SERPL-MCNC: 9.6 MG/DL — SIGNIFICANT CHANGE UP (ref 8.4–10.5)
CHLORIDE SERPL-SCNC: 97 MMOL/L — SIGNIFICANT CHANGE UP (ref 96–108)
CO2 SERPL-SCNC: 23 MMOL/L — SIGNIFICANT CHANGE UP (ref 22–31)
CREAT SERPL-MCNC: 0.58 MG/DL — SIGNIFICANT CHANGE UP (ref 0.5–1.3)
GLUCOSE SERPL-MCNC: 150 MG/DL — HIGH (ref 70–99)
HCT VFR BLD CALC: 38.8 % — SIGNIFICANT CHANGE UP (ref 34.5–45)
HGB BLD-MCNC: 12.2 G/DL — SIGNIFICANT CHANGE UP (ref 11.5–15.5)
MCHC RBC-ENTMCNC: 25.1 PG — LOW (ref 27–34)
MCHC RBC-ENTMCNC: 31.4 GM/DL — LOW (ref 32–36)
MCV RBC AUTO: 79.8 FL — LOW (ref 80–100)
PLATELET # BLD AUTO: 310 K/UL — SIGNIFICANT CHANGE UP (ref 150–400)
POTASSIUM SERPL-MCNC: 3.8 MMOL/L — SIGNIFICANT CHANGE UP (ref 3.5–5.3)
POTASSIUM SERPL-SCNC: 3.8 MMOL/L — SIGNIFICANT CHANGE UP (ref 3.5–5.3)
RBC # BLD: 4.86 M/UL — SIGNIFICANT CHANGE UP (ref 3.8–5.2)
RBC # FLD: 17.7 % — HIGH (ref 10.3–14.5)
SODIUM SERPL-SCNC: 137 MMOL/L — SIGNIFICANT CHANGE UP (ref 135–145)
WBC # BLD: 10.16 K/UL — SIGNIFICANT CHANGE UP (ref 3.8–10.5)
WBC # FLD AUTO: 10.16 K/UL — SIGNIFICANT CHANGE UP (ref 3.8–10.5)

## 2019-05-23 PROCEDURE — 99239 HOSP IP/OBS DSCHRG MGMT >30: CPT

## 2019-05-23 RX ORDER — HYDROMORPHONE HYDROCHLORIDE 2 MG/ML
0.5 INJECTION INTRAMUSCULAR; INTRAVENOUS; SUBCUTANEOUS ONCE
Refills: 0 | Status: DISCONTINUED | OUTPATIENT
Start: 2019-05-23 | End: 2019-05-23

## 2019-05-23 RX ORDER — UBIDECARENONE 100 MG
0 CAPSULE ORAL
Qty: 0 | Refills: 0 | DISCHARGE

## 2019-05-23 RX ORDER — OXYCODONE HYDROCHLORIDE 5 MG/1
1 TABLET ORAL
Qty: 30 | Refills: 0
Start: 2019-05-23

## 2019-05-23 RX ORDER — DOCUSATE SODIUM 100 MG
1 CAPSULE ORAL
Qty: 0 | Refills: 0 | DISCHARGE
Start: 2019-05-23

## 2019-05-23 RX ORDER — SENNA PLUS 8.6 MG/1
2 TABLET ORAL
Qty: 0 | Refills: 0 | DISCHARGE
Start: 2019-05-23

## 2019-05-23 RX ORDER — ALBUTEROL 90 UG/1
1 AEROSOL, METERED ORAL
Qty: 0 | Refills: 0 | DISCHARGE
Start: 2019-05-23

## 2019-05-23 RX ORDER — ACETAMINOPHEN 500 MG
2 TABLET ORAL
Qty: 0 | Refills: 0 | DISCHARGE
Start: 2019-05-23

## 2019-05-23 RX ADMIN — Medication 650 MILLIGRAM(S): at 04:45

## 2019-05-23 RX ADMIN — OXYCODONE HYDROCHLORIDE 10 MILLIGRAM(S): 5 TABLET ORAL at 01:52

## 2019-05-23 RX ADMIN — OXYCODONE HYDROCHLORIDE 10 MILLIGRAM(S): 5 TABLET ORAL at 01:22

## 2019-05-23 RX ADMIN — Medication 4 MILLIGRAM(S): at 05:35

## 2019-05-23 RX ADMIN — Medication 650 MILLIGRAM(S): at 04:15

## 2019-05-23 NOTE — DISCHARGE NOTE PROVIDER - CARE PROVIDER_API CALL
Gavin Mora)  Neurological Surgery  [address] [place] [address]  Phone: [phone]  Fax: [phone]  Follow Up Time:

## 2019-05-23 NOTE — PROGRESS NOTE ADULT - SUBJECTIVE AND OBJECTIVE BOX
SUBJECTIVE: Patient's headache is much improved this am.        Vital Signs Last 24 Hrs  T(C): 37 (23 May 2019 07:46), Max: 37.9 (22 May 2019 15:00)  T(F): 98.6 (23 May 2019 07:46), Max: 100.2 (22 May 2019 15:00)  HR: 87 (23 May 2019 07:46) (84 - 108)  BP: 131/80 (23 May 2019 07:46) (112/76 - 152/99)  BP(mean): 89 (22 May 2019 15:00) (85 - 100)  RR: 18 (23 May 2019 07:46) (12 - 18)  SpO2: 95% (23 May 2019 07:46) (90% - 97%)    PHYSICAL EXAM:    Constitutional: No Acute Distress     Neurological: AOx3, Following Commands, Moving all Extremities     Motor exam:          Upper extremity                         Delt     Bicep     Tricep    HG                                                 R         5/5        5/5        5/5       5/5                                               L          5/5        5/5        5/5       5/5          Lower extremity                        HF         KF        KE       DF         PF                                                  R        5/5        5/5        5/5       5/5         5/5                                               L         5/5        5/5       5/5       5/5          5/5                                                 Sensation: [x] intact to light touch  [] decreased:     Pulmonary: Clear to Auscultation, No rales, No rhonchi, No wheezes     Cardiovascular: S1, S2, Regular rate and rhythm     Gastrointestinal: Soft, Non-tender, Non-distended     Extremities: No calf tenderness     LABS:                        12.2   10.16 )-----------( 310      ( 23 May 2019 08:44 )             38.8    05-23    137  |  97  |  11  ----------------------------<  150<H>  3.8   |  23  |  0.58    Ca    9.6      23 May 2019 06:09  Phos  4.1     05-22  Mg     2.0     05-22    TPro  7.7  /  Alb  4.4  /  TBili  0.3  /  DBili  x   /  AST  20  /  ALT  34  /  AlkPhos  138<H>  05-21  PT/INR - ( 21 May 2019 11:55 )   PT: 12.0 sec;   INR: 1.05 ratio         PTT - ( 21 May 2019 11:55 )  PTT:34.0 secHemoglobin A1C, Whole Blood: 6.3 % (05-21-19 @ 16:29)      05-22 @ 07:01  -  05-23 @ 07:00  --------------------------------------------------------  IN: 970 mL / OUT: 0 mL / NET: 970 mL       MEDICATIONS:  Anticoagulation:     Antibiotics:    Endo:  dexamethasone     Tablet 4 milliGRAM(s) Oral every 8 hours  dexamethasone     Tablet   Oral     Neuro:  acetaminophen   Tablet .. 650 milliGRAM(s) Oral every 6 hours PRN Temp greater or equal to 38C (100.4F), Mild Pain (1 - 3)  HYDROmorphone  Injectable 0.5 milliGRAM(s) IV Push once  oxyCODONE    IR 5 milliGRAM(s) Oral every 4 hours PRN Moderate Pain (4 - 6)    Cardiac:    Pulm:  ALBUTerol    90 MICROgram(s) HFA Inhaler 1 Puff(s) Inhalation every 4 hours PRN sob    GI/:  docusate sodium 100 milliGRAM(s) Oral two times a day  senna 2 Tablet(s) Oral at bedtime    Other:     DIET: regular

## 2019-05-23 NOTE — DISCHARGE NOTE PROVIDER - NSDCACTIVITY_GEN_ALL_CORE
Showering allowed/Do not make important decisions/Bathing allowed/Do not drive or operate machinery/Stairs allowed/Walking - Indoors allowed/No heavy lifting/straining/Walking - Outdoors allowed

## 2019-05-23 NOTE — DISCHARGE NOTE PROVIDER - NSDCCPCAREPLAN_GEN_ALL_CORE_FT
PRINCIPAL DISCHARGE DIAGNOSIS  Diagnosis: SAH (subarachnoid hemorrhage)  Assessment and Plan of Treatment: Follow up with Dr. Mora in 2 weeks.

## 2019-05-23 NOTE — PROGRESS NOTE ADULT - ASSESSMENT
HH1, PBD #3  MF1 Perimesencephalic SAH    NEURO: Perimesencephalic SAH/ Headache   CTA Head negative, conventional angiogram- NO aneurysn or AVM  MRI negative from 5/22  No need for seizure prophylaxsis or nimodipine with neg angio.   Risk for Delayed Cerebral Ischemia low   Pain: PRN analgesics with Tylenol   Activity: [X] OOB as tolerated  [X] PT      PULM: Asthma   Incentive spirometry  maintain sats > 92%  Bronchodilators prn wheezing    CV:  SBP goal- 100-< 150        RENAL:  Fluids:  IVL      GI: IBD  Diet: CCD diet  GI prophylaxis [x] not indicated   Bowel regimen [x] colace [x] senna     ENDO: Prediabetic  Goal euglycemia (-180)    HEME/ONC:  VTE prophylaxis: [X] SCDs [X] chemoprophylaxis - lovenox    ID: afebrile       PT outpatient dc home today  TEETEE Mora

## 2019-05-23 NOTE — DISCHARGE NOTE PROVIDER - HOSPITAL COURSE
Felicia Garcia, 53 Year old right handed female with past medical history of  asthma had sudden onset of worst headache of life while vacuuming 5/20/2019. States she had nausea and a brief period of diplopia which have both resolved. She complained of severe headache mainly occipital, no neck pain. Denies focal motor or sensory loss, denies lethargy.HH1/MF1. Patient has a cerebral angiogram 5/21/2019 which was negative for aneurysm or vascular malformation.  Patient had a MRI on 5/22/2019 which showed no vascular malformation.  Patient's headaches started to resolve.  Patient seen by physical therapy and recommended for home, outpatient PT.        More than 30 minutes were spent educating the patient and family regarding condition, medications, follow up plans, signs and symptoms to be concerned with, preparing paperwork, and questions answered regarding discharge.

## 2019-06-10 ENCOUNTER — APPOINTMENT (OUTPATIENT)
Dept: NEUROSURGERY | Facility: CLINIC | Age: 54
End: 2019-06-10
Payer: MEDICAID

## 2019-06-10 VITALS
HEART RATE: 91 BPM | WEIGHT: 188 LBS | BODY MASS INDEX: 32.1 KG/M2 | RESPIRATION RATE: 16 BRPM | HEIGHT: 64 IN | OXYGEN SATURATION: 98 % | SYSTOLIC BLOOD PRESSURE: 138 MMHG | DIASTOLIC BLOOD PRESSURE: 91 MMHG | TEMPERATURE: 98.1 F

## 2019-06-10 PROCEDURE — 99214 OFFICE O/P EST MOD 30 MIN: CPT

## 2019-06-10 NOTE — PHYSICAL EXAM
[General Appearance - Alert] : alert [Person] : oriented to person [General Appearance - In No Acute Distress] : in no acute distress [Time] : oriented to time [Cranial Nerves Optic (II)] : visual acuity intact bilaterally,  pupils equal round and reactive to light [Place] : oriented to place [Cranial Nerves Oculomotor (III)] : extraocular motion intact [Cranial Nerves Trigeminal (V)] : facial sensation intact symmetrically [Cranial Nerves Vestibulocochlear (VIII)] : hearing was intact bilaterally [Cranial Nerves Glossopharyngeal (IX)] : tongue and palate midline [Cranial Nerves Facial (VII)] : face symmetrical [Cranial Nerves Accessory (XI - Cranial And Spinal)] : head turning and shoulder shrug symmetric [Cranial Nerves Hypoglossal (XII)] : there was no tongue deviation with protrusion [Motor Strength] : muscle strength was normal in all four extremities [Involuntary Movements] : no involuntary movements were seen [] : no respiratory distress [Abnormal Walk] : normal gait

## 2019-06-12 ENCOUNTER — APPOINTMENT (OUTPATIENT)
Dept: OPHTHALMOLOGY | Facility: CLINIC | Age: 54
End: 2019-06-12
Payer: MEDICAID

## 2019-06-12 PROCEDURE — 92060 SENSORIMOTOR EXAMINATION: CPT

## 2019-06-12 PROCEDURE — 99204 OFFICE O/P NEW MOD 45 MIN: CPT

## 2019-06-12 RX ORDER — OXYCODONE 5 MG/1
5 TABLET ORAL
Qty: 30 | Refills: 0 | Status: ACTIVE | COMMUNITY
Start: 2019-05-23

## 2019-06-12 RX ORDER — ALBUTEROL SULFATE 90 UG/1
108 (90 BASE) INHALANT RESPIRATORY (INHALATION)
Qty: 18 | Refills: 0 | Status: ACTIVE | COMMUNITY
Start: 2018-12-01

## 2019-06-12 NOTE — REASON FOR VISIT
[Follow-Up: _____] : a [unfilled] follow-up visit [Spouse] : spouse [FreeTextEntry1] : Felicia Garcia is a 54yr old female here for a hospital follow up visit. She presented to [facility] with a severe headache 5/21/2019.  Radiographic imaging was significant for a subarachnoid hemorrhage HH1. She underwent formal cerebral angiography 5/21/2019 which demonstrated no vascular malformations. Additionally she underwent MRI brain and spinal axis which did not reveal a cause of the subarachnoid hemorrhage. SHe was discharged home in stable condition 5/23/2019. Today she feels double vision which started 4days ago. SHe still has mild headaches and takes two tylenol which provides her with relief. \par PCP: Bossman Monique\par \par Hospital Course	 \par Felicia Garcia, 53 Year old right handed female with past medical history of \par asthma had sudden onset of worst headache of life while vacuuming 5/20/2019. \par States she had nausea and a brief period of diplopia which have both resolved. \par She complained of severe headache mainly occipital, no neck pain. Denies focal \par motor or sensory loss, denies lethargy.HH1/MF1. Patient has a cerebral \par angiogram 5/21/2019 which was negative for aneurysm or vascular malformation. \par Patient had a MRI on 5/22/2019 which showed no vascular malformation. \par Patient's headaches started to resolve.  Patient seen by physical therapy and \par recommended for home, outpatient PT. \par

## 2019-06-12 NOTE — ASSESSMENT
[FreeTextEntry1] : Impression: 54yr old female with subarachnoid hemorrhage HH1 cerebral angiogram 5/21/2019 negative for vascular malformation \par \par Plan: \par Reviewed the cerebral angiogram and MRI, both were normal did not demonstrate any vascular malformations as a source for the subarachnoid hemorrhage\par Follow up with eye doctor on Wednesday 6/12/2019 as scheduled for the double vision\par MRI brain non con now to evaluate for residual SAH \par MRA brain non con now to evaluate for underlying vascular malformation\par Cerebral Angiogram August 2019

## 2019-06-12 NOTE — DATA REVIEWED
[de-identified] : MRI brain and cranial axis 5/22/2019 [de-identified] : cerebral angiogram 5/21/2019

## 2019-06-12 NOTE — REVIEW OF SYSTEMS
[As Noted in HPI] : as noted in HPI [Negative] : Heme/Lymph [FreeTextEntry3] : double vision  [de-identified] : headaches

## 2019-06-24 ENCOUNTER — OUTPATIENT (OUTPATIENT)
Dept: OUTPATIENT SERVICES | Facility: HOSPITAL | Age: 54
LOS: 1 days | End: 2019-06-24
Payer: MEDICAID

## 2019-06-24 ENCOUNTER — APPOINTMENT (OUTPATIENT)
Dept: MRI IMAGING | Facility: CLINIC | Age: 54
End: 2019-06-24
Payer: MEDICAID

## 2019-06-24 DIAGNOSIS — Z00.8 ENCOUNTER FOR OTHER GENERAL EXAMINATION: ICD-10-CM

## 2019-06-24 PROCEDURE — 70544 MR ANGIOGRAPHY HEAD W/O DYE: CPT | Mod: 26,59

## 2019-06-24 PROCEDURE — 70544 MR ANGIOGRAPHY HEAD W/O DYE: CPT

## 2019-06-24 PROCEDURE — 70551 MRI BRAIN STEM W/O DYE: CPT | Mod: 26

## 2019-06-24 PROCEDURE — 70551 MRI BRAIN STEM W/O DYE: CPT

## 2019-07-10 PROCEDURE — 80061 LIPID PANEL: CPT

## 2019-07-10 PROCEDURE — 70498 CT ANGIOGRAPHY NECK: CPT

## 2019-07-10 PROCEDURE — 96374 THER/PROPH/DIAG INJ IV PUSH: CPT | Mod: XU

## 2019-07-10 PROCEDURE — 80048 BASIC METABOLIC PNL TOTAL CA: CPT

## 2019-07-10 PROCEDURE — 36227 PLACE CATH XTRNL CAROTID: CPT

## 2019-07-10 PROCEDURE — 93005 ELECTROCARDIOGRAM TRACING: CPT

## 2019-07-10 PROCEDURE — 86900 BLOOD TYPING SEROLOGIC ABO: CPT

## 2019-07-10 PROCEDURE — 84100 ASSAY OF PHOSPHORUS: CPT

## 2019-07-10 PROCEDURE — 85027 COMPLETE CBC AUTOMATED: CPT

## 2019-07-10 PROCEDURE — 86850 RBC ANTIBODY SCREEN: CPT

## 2019-07-10 PROCEDURE — 36226 PLACE CATH VERTEBRAL ART: CPT

## 2019-07-10 PROCEDURE — 72156 MRI NECK SPINE W/O & W/DYE: CPT

## 2019-07-10 PROCEDURE — 80053 COMPREHEN METABOLIC PANEL: CPT

## 2019-07-10 PROCEDURE — 99285 EMERGENCY DEPT VISIT HI MDM: CPT | Mod: 25

## 2019-07-10 PROCEDURE — 86901 BLOOD TYPING SEROLOGIC RH(D): CPT

## 2019-07-10 PROCEDURE — C1894: CPT

## 2019-07-10 PROCEDURE — 36224 PLACE CATH CAROTD ART: CPT

## 2019-07-10 PROCEDURE — 97162 PT EVAL MOD COMPLEX 30 MIN: CPT

## 2019-07-10 PROCEDURE — 83735 ASSAY OF MAGNESIUM: CPT

## 2019-07-10 PROCEDURE — 83036 HEMOGLOBIN GLYCOSYLATED A1C: CPT

## 2019-07-10 PROCEDURE — C1769: CPT

## 2019-07-10 PROCEDURE — 85610 PROTHROMBIN TIME: CPT

## 2019-07-10 PROCEDURE — 70450 CT HEAD/BRAIN W/O DYE: CPT

## 2019-07-10 PROCEDURE — C1887: CPT

## 2019-07-10 PROCEDURE — 85730 THROMBOPLASTIN TIME PARTIAL: CPT

## 2019-07-10 PROCEDURE — 70496 CT ANGIOGRAPHY HEAD: CPT

## 2019-07-10 PROCEDURE — 70553 MRI BRAIN STEM W/O & W/DYE: CPT

## 2019-07-15 ENCOUNTER — NON-APPOINTMENT (OUTPATIENT)
Age: 54
End: 2019-07-15

## 2019-07-15 ENCOUNTER — APPOINTMENT (OUTPATIENT)
Dept: OPHTHALMOLOGY | Facility: CLINIC | Age: 54
End: 2019-07-15
Payer: MEDICAID

## 2019-07-15 PROCEDURE — 92012 INTRM OPH EXAM EST PATIENT: CPT

## 2020-08-03 ENCOUNTER — APPOINTMENT (OUTPATIENT)
Dept: NEUROSURGERY | Facility: CLINIC | Age: 55
End: 2020-08-03

## 2020-08-03 NOTE — REASON FOR VISIT
[Follow-Up: _____] : a [unfilled] follow-up visit [FreeTextEntry1] : Felicia Garcia is a 55yr old female who presented to [facility] with a severe headache 5/21/2019. Radiographic imaging was significant for a subarachnoid hemorrhage HH1. She underwent formal cerebral angiography 5/21/2019 which demonstrated no vascular malformations. Additionally she underwent MRI brain and spinal axis which did not reveal a cause of the subarachnoid hemorrhage. SHe was discharged home in stable condition 5/23/2019. She underwent repeat MR brain which showed resolution of the hemorrhage in June 2019.  She was last seen in the office Austen 10, 2019. At that time the plan was for her to get a final cerebral angiogram. She did not wish to proceed with that angiogram.\par \par PCP: Bossman Monique

## 2020-08-03 NOTE — ASSESSMENT
[FreeTextEntry1] : Impression: 55yr old female with subarachnoid hemorrhage HH1 cerebral angiogram 5/21/2019 negative for vascular malformation \par \par Plan: \par Reviewed the cerebral angiogram and MRI, both were normal did not demonstrate any vascular malformations as a source for the subarachnoid hemorrhage\par

## 2020-09-08 DIAGNOSIS — Z01.818 ENCOUNTER FOR OTHER PREPROCEDURAL EXAMINATION: ICD-10-CM

## 2020-09-09 ENCOUNTER — APPOINTMENT (OUTPATIENT)
Dept: DISASTER EMERGENCY | Facility: CLINIC | Age: 55
End: 2020-09-09

## 2020-09-09 ENCOUNTER — LABORATORY RESULT (OUTPATIENT)
Age: 55
End: 2020-09-09

## 2020-09-10 ENCOUNTER — TRANSCRIPTION ENCOUNTER (OUTPATIENT)
Age: 55
End: 2020-09-10

## 2020-09-10 ENCOUNTER — OUTPATIENT (OUTPATIENT)
Dept: OUTPATIENT SERVICES | Facility: HOSPITAL | Age: 55
LOS: 1 days | End: 2020-09-10

## 2020-09-10 VITALS
WEIGHT: 195.99 LBS | TEMPERATURE: 98 F | RESPIRATION RATE: 16 BRPM | HEIGHT: 62 IN | DIASTOLIC BLOOD PRESSURE: 88 MMHG | SYSTOLIC BLOOD PRESSURE: 132 MMHG | OXYGEN SATURATION: 99 % | HEART RATE: 86 BPM

## 2020-09-10 DIAGNOSIS — Z98.890 OTHER SPECIFIED POSTPROCEDURAL STATES: Chronic | ICD-10-CM

## 2020-09-10 DIAGNOSIS — Z82.49 FAMILY HISTORY OF ISCHEMIC HEART DISEASE AND OTHER DISEASES OF THE CIRCULATORY SYSTEM: ICD-10-CM

## 2020-09-10 DIAGNOSIS — N95.0 POSTMENOPAUSAL BLEEDING: ICD-10-CM

## 2020-09-10 DIAGNOSIS — I10 ESSENTIAL (PRIMARY) HYPERTENSION: ICD-10-CM

## 2020-09-10 LAB
HCG UR-SCNC: NEGATIVE — SIGNIFICANT CHANGE UP
SP GR UR: 1.01 — SIGNIFICANT CHANGE UP (ref 1–1.04)

## 2020-09-10 NOTE — ASU PATIENT PROFILE, ADULT - TEACHING/LEARNING RELIGIOUS CONSIDERATIONS
Patient would like to know if she needs to keep today's appointment. She said she never started the medication because you told her not to due to her thyroid and is wondering if today's appt is necessary.    none

## 2020-09-10 NOTE — H&P PST ADULT - MUSCULOSKELETAL
details… detailed exam no joint warmth/no calf tenderness/ROM intact/normal strength/no joint swelling/no joint erythema

## 2020-09-10 NOTE — H&P PST ADULT - RS GEN PE MLT RESP DETAILS PC
clear to auscultation bilaterally/respirations non-labored/no rhonchi/breath sounds equal/good air movement/no rales/no wheezes

## 2020-09-10 NOTE — H&P PST ADULT - NSICDXFAMILYHX_GEN_ALL_CORE_FT
FAMILY HISTORY:  Father  Still living? Unknown  Family history of early CAD, Age at diagnosis: Age Unknown    Mother  Still living? Unknown  Family history of early CAD, Age at diagnosis: Age Unknown

## 2020-09-10 NOTE — H&P PST ADULT - HISTORY OF PRESENT ILLNESS
This is a 55 y.o. female LMP 8/31/20 This is a 55 y.o. female LMP 20 . Pt presented to Dr MYESHA Monique complaining of postmenopausal bleeding , exam done , PAP done , sono done. Pt has postmenopausal bleeding , now for surgery .

## 2020-09-10 NOTE — H&P PST ADULT - NSICDXPASTMEDICALHX_GEN_ALL_CORE_FT
PAST MEDICAL HISTORY:  Anemia     Asthma     H/O aneurysm brain  - 5/21/2019 - JOSE E no intervention - Neuro Dr Mora    Hypertension recently placed on atenelol PAST MEDICAL HISTORY:  Anemia     Asthma     H/O aneurysm brain  - 5/21/2019 - JOSE E no intervention - Neuro Dr Mora    H/O subarachnoid hemorrhage 5/2019 - no deficit    Hypertension recently placed on atenelol

## 2020-09-10 NOTE — H&P PST ADULT - ACTIVITY
walks 1 mile daily,stairs 15 flights daily , housework , ADL's walks 1 mile daily, stairs 15 flights daily , housework , ADL's

## 2020-09-10 NOTE — H&P PST ADULT - NSICDXPROBLEM_GEN_ALL_CORE_FT
PROBLEM DIAGNOSES  Problem: Postmenopausal bleeding  Assessment and Plan: Dilation and curettage , hysteroscopy   one day pre op , ucg  sent stat   Discussed with DR Huyen Small given as per JOSE E recommendation   Medical clearance as per surgeon , on chart     Problem: Hypertension  Assessment and Plan: pt instructed to take atenelol day of surgery ,monitor BP during hospital stay .     Problem: Family history of brain aneurysm  Assessment and Plan: 2019 - no intervention , last CT of brain on chart PROBLEM DIAGNOSES  Problem: Postmenopausal bleeding  Assessment and Plan: Dilation and curettage , hysteroscopy   one day pre op , ucg  sent stat   Discussed with DR Matson   Famotidine given as per JOSE E recommendation   Medical clearance as per surgeon , on chart     Problem: Family history of brain aneurysm  Assessment and Plan: 2019 - no intervention ,MRA 2019 - on chart     Problem: Hypertension  Assessment and Plan: pt instructed to take atenelol day of surgery ,monitor BP during hospital stay .

## 2020-09-10 NOTE — ASU PATIENT PROFILE, ADULT - PMH
Anemia    Asthma    H/O aneurysm  brain  - 5/21/2019 - LIBRYANNA no intervention - Neuro Dr Mora  H/O subarachnoid hemorrhage  5/2019 - no deficit  Hypertension  recently placed on atenelol

## 2020-09-11 ENCOUNTER — RESULT REVIEW (OUTPATIENT)
Age: 55
End: 2020-09-11

## 2020-09-11 ENCOUNTER — OUTPATIENT (OUTPATIENT)
Dept: OUTPATIENT SERVICES | Facility: HOSPITAL | Age: 55
LOS: 1 days | Discharge: HOME | End: 2020-09-11
Payer: MEDICAID

## 2020-09-11 VITALS
RESPIRATION RATE: 15 BRPM | SYSTOLIC BLOOD PRESSURE: 130 MMHG | WEIGHT: 195.99 LBS | TEMPERATURE: 99 F | HEIGHT: 62 IN | HEART RATE: 70 BPM | DIASTOLIC BLOOD PRESSURE: 80 MMHG | OXYGEN SATURATION: 98 %

## 2020-09-11 VITALS
RESPIRATION RATE: 16 BRPM | DIASTOLIC BLOOD PRESSURE: 68 MMHG | SYSTOLIC BLOOD PRESSURE: 119 MMHG | OXYGEN SATURATION: 96 % | HEART RATE: 60 BPM

## 2020-09-11 DIAGNOSIS — N95.0 POSTMENOPAUSAL BLEEDING: ICD-10-CM

## 2020-09-11 DIAGNOSIS — Z98.890 OTHER SPECIFIED POSTPROCEDURAL STATES: Chronic | ICD-10-CM

## 2020-09-11 DIAGNOSIS — N84.0 POLYP OF CORPUS UTERI: ICD-10-CM

## 2020-09-11 LAB — HCG UR QL: NEGATIVE — SIGNIFICANT CHANGE UP

## 2020-09-11 PROCEDURE — 88305 TISSUE EXAM BY PATHOLOGIST: CPT | Mod: 26

## 2020-09-11 RX ORDER — ACETAMINOPHEN 500 MG
975 TABLET ORAL ONCE
Refills: 0 | Status: DISCONTINUED | OUTPATIENT
Start: 2020-09-11 | End: 2020-09-26

## 2020-09-11 RX ORDER — SODIUM CHLORIDE 9 MG/ML
1000 INJECTION, SOLUTION INTRAVENOUS
Refills: 0 | Status: DISCONTINUED | OUTPATIENT
Start: 2020-09-11 | End: 2020-09-26

## 2020-09-11 RX ORDER — ALBUTEROL 90 UG/1
2 AEROSOL, METERED ORAL
Qty: 0 | Refills: 0 | DISCHARGE

## 2020-09-11 RX ORDER — ATENOLOL 25 MG/1
1 TABLET ORAL
Qty: 0 | Refills: 0 | DISCHARGE

## 2020-09-11 RX ORDER — IBUPROFEN 200 MG
600 TABLET ORAL EVERY 6 HOURS
Refills: 0 | Status: DISCONTINUED | OUTPATIENT
Start: 2020-09-11 | End: 2020-09-26

## 2020-09-11 NOTE — ASU DISCHARGE PLAN (ADULT/PEDIATRIC) - CALL YOUR DOCTOR IF YOU HAVE ANY OF THE FOLLOWING:
Nausea and vomiting that does not stop/Unable to urinate/Bleeding that does not stop/Pain not relieved by Medications/Fever greater than (need to indicate Fahrenheit or Celsius)

## 2020-09-11 NOTE — ASU DISCHARGE PLAN (ADULT/PEDIATRIC) - NURSING INSTRUCTIONS
You were given intravenous TYLENOL for pain management at _4pm_. Please DO NOT take any products containing TYLENOL or ACETAMINOPHEN, for the next 6 hours (until _10pm_). This includes medications such as VICODIN, PERCOCET, EXCEDRIN, and any over-the-counter cold medication. DO NOT TAKE MORE THAN 3000 MG OF TYLENOL in a 24 hour period. You were given intravenous TORADOL for pain management at _4pm_. Please DO NOT take any NSAIDS for the next 6 hours (until _10pm_). This includes all ibuprofen containing products such as MOTRIN or ADVIL, or any other NSAIDs (Non-Steroidal Anti-Inflammatory Drugs) such as ALEVE.

## 2020-09-11 NOTE — ASU DISCHARGE PLAN (ADULT/PEDIATRIC) - ASU DC SPECIAL INSTRUCTIONSFT
Return to your regular way of eating.  Resume normal activity as tolerated, but no heavy lifting or strenuous activity for 2 weeks.  No driving for next 2 weeks and/or while on narcotic pain medication.  Complete vaginal rest, no tampons, no douching, no tub bathing, no sexual activities for 2 weeks unless otherwise instructed by your doctor.  Call your doctor with any signs and symptoms of infection such as fever, chills, nausea or vomiting.  Call your doctor with redness or swelling at the incision site, fluid leakage or wound separation.  Call your doctor if you're unable to tolerate food or have difficulty urinating.  Call your doctor if you have pain that is not relieved by your prescribed medications.  Notify your doctor with any other concerns.  Follow up with Dr. Ellison in 2 weeks.

## 2020-09-11 NOTE — ASU DISCHARGE PLAN (ADULT/PEDIATRIC) - CARE PROVIDER_API CALL
Valdez Ellison  [facility]ICS [facility] G[facility]  [address] [place] [address]  Phone: [phone]  Fax: [phone]  Follow Up Time:

## 2020-09-11 NOTE — BRIEF OPERATIVE NOTE - OPERATION/FINDINGS
EUA: anteverted nonenlarged uterus, adnexa nonpalpable bilaterally  Uterine cavity visualized, ostia not visualized

## 2020-09-16 LAB — SURGICAL PATHOLOGY STUDY: SIGNIFICANT CHANGE UP

## 2021-06-22 NOTE — ED PROVIDER NOTE - GASTROINTESTINAL, MLM
D:  Pt paged Coordinator on call to report he has not been taking his lisinopril due to it not coming in the package from the pharmacy.  Lisinopril was ordered to be increased on 6/16/21.  I:  Instructed pt to call pharmacy to have lisinopril sent.  A:  Pt verbalized understanding.  I:  Call to  to discuss pt's difficulties at home.  Order sent to [facility] (pt's previous HH) for nurse evaluation.  Medication assistance, blood draws, vitals and possibly the weekly dressing change.  A:  HH will see patient on Saturday.  Pt to be seen in clinic on Wednesday for Dressing Change education.  SW will also see pt in clinic tomorrow.  P:  VAD Coordinator will continue to follow.  
Abdomen soft, non-tender, no guarding.

## 2022-01-18 PROBLEM — Z86.79 PERSONAL HISTORY OF OTHER DISEASES OF THE CIRCULATORY SYSTEM: Chronic | Status: ACTIVE | Noted: 2020-09-10

## 2022-01-18 PROBLEM — I10 ESSENTIAL (PRIMARY) HYPERTENSION: Chronic | Status: ACTIVE | Noted: 2020-09-10

## 2022-02-07 ENCOUNTER — APPOINTMENT (OUTPATIENT)
Dept: OTOLARYNGOLOGY | Facility: CLINIC | Age: 57
End: 2022-02-07
Payer: MEDICAID

## 2022-02-07 VITALS
DIASTOLIC BLOOD PRESSURE: 100 MMHG | HEIGHT: 64 IN | SYSTOLIC BLOOD PRESSURE: 151 MMHG | TEMPERATURE: 98 F | WEIGHT: 188 LBS | BODY MASS INDEX: 32.1 KG/M2 | HEART RATE: 92 BPM

## 2022-02-07 DIAGNOSIS — I60.9 NONTRAUMATIC SUBARACHNOID HEMORRHAGE, UNSPECIFIED: ICD-10-CM

## 2022-02-07 DIAGNOSIS — I67.1 CEREBRAL ANEURYSM, NONRUPTURED: ICD-10-CM

## 2022-02-07 DIAGNOSIS — H49.21 SIXTH [ABDUCENT] NERVE PALSY, RIGHT EYE: ICD-10-CM

## 2022-02-07 DIAGNOSIS — L29.9 PRURITUS, UNSPECIFIED: ICD-10-CM

## 2022-02-07 PROCEDURE — 99204 OFFICE O/P NEW MOD 45 MIN: CPT

## 2022-02-07 RX ORDER — HALOBETASOL PROPIONATE 0.5 MG/G
0.05 CREAM TOPICAL
Qty: 30 | Refills: 0 | Status: ACTIVE | COMMUNITY
Start: 2021-10-19

## 2022-02-07 RX ORDER — ACETIC ACID 20 MG/ML
2 SOLUTION AURICULAR (OTIC) TWICE DAILY
Qty: 1 | Refills: 0 | Status: ACTIVE | COMMUNITY
Start: 2022-02-07 | End: 1900-01-01

## 2022-02-07 RX ORDER — PEN NEEDLE, DIABETIC 32GX 5/32"
NEEDLE, DISPOSABLE MISCELLANEOUS
Qty: 1 | Refills: 0 | Status: ACTIVE | COMMUNITY
Start: 2021-09-30

## 2022-02-07 RX ORDER — BLOOD-GLUCOSE METER
EACH MISCELLANEOUS
Qty: 1 | Refills: 0 | Status: ACTIVE | COMMUNITY
Start: 2021-09-30

## 2022-02-07 RX ORDER — KETOCONAZOLE 20 MG/G
2 CREAM TOPICAL
Qty: 30 | Refills: 0 | Status: ACTIVE | COMMUNITY
Start: 2021-10-19

## 2022-02-07 RX ORDER — FLUTICASONE FUROATE AND VILANTEROL TRIFENATATE 200; 25 UG/1; UG/1
200-25 POWDER RESPIRATORY (INHALATION)
Qty: 60 | Refills: 0 | Status: ACTIVE | COMMUNITY
Start: 2022-01-25

## 2022-02-07 RX ORDER — ISOPROPYL ALCOHOL 0.7 ML/ML
SWAB TOPICAL
Qty: 100 | Refills: 0 | Status: ACTIVE | COMMUNITY
Start: 2022-01-27

## 2022-02-07 RX ORDER — CEFUROXIME AXETIL 500 MG/1
500 TABLET ORAL
Qty: 14 | Refills: 0 | Status: ACTIVE | COMMUNITY
Start: 2021-11-17

## 2022-02-07 RX ORDER — BLOOD SUGAR DIAGNOSTIC
STRIP MISCELLANEOUS
Qty: 100 | Refills: 0 | Status: ACTIVE | COMMUNITY
Start: 2022-01-27

## 2022-02-07 RX ORDER — ALCOHOL ANTISEPTIC PADS
PADS, MEDICATED (EA) TOPICAL
Qty: 100 | Refills: 0 | Status: ACTIVE | COMMUNITY
Start: 2022-01-27

## 2022-02-07 NOTE — END OF VISIT
[FreeTextEntry3] : I personally saw and examined SKINNY SCHWARTZ in detail. I spoke to CRISTINE Bowers regarding the assessment and plan of care. I reviewed the above assessment and plan of care, and agree. I have made changes in changes in the body of the note where appropriate.I personally reviewed the HPI, PMH, FH, SH, ROS and medications/allergies. I have spoken to CRISTINE Bowers regarding the history and have personally determined the assessment and plan of care, and documented this myself. I was present and participated in all key portions of the encounter and all procedures noted above. I have made changes in the body of the note where appropriate.\par \par Attesting Faculty: See Attending Signature Below \par \par \par  [Time Spent: ___ minutes] : I have spent [unfilled] minutes of time on the encounter.

## 2022-02-07 NOTE — HISTORY OF PRESENT ILLNESS
[No] : patient does not have a  history of radiation therapy [de-identified] : 55 yo female\par Patient complains of tingling sensation in both ears x 1 month. She was seen UC, was told that she has fluid in both ears, she was given abx. She finished abx and continues to have tingling sensation with dull aching pain. Pt has no ear drainage, tinnitus, vertigo, nasal congestion, nasal discharge, epistaxis, sinus infections, facial pain, facial pressure, throat pain, dysphagia or fevers\par \par  [Hearing Loss] : hearing loss [Otalgia] : otalgia [Otorrhea] : no otorrhea [Otitis Media with Effusion] : otitis media with effusion [Eustachian Tube Dysfunction] : eustachian tube dysfunction [Early Onset Hearing Loss] : no early onset hearing loss [Stroke] : no stroke [Allergic Rhinitis] : no allergic rhinitis [Adenoidectomy] : no adenoidectomy [Nasal FB Removal] : no nasal foreign body removal [Allergies] : no allergies [Asthma] : no asthma [Hyperthyroidism] : no hyperthyroidism [Sialadenitis] : no sialadenitis [Hodgkin Disease] : no hodgkin disease [Non-Hodgkin Lymphoma] : no non-hodgkin lymphoma [None] : No risk factors have been identified. [Graves Disease] : no graves disease [Thyroid Cancer] : no thyroid cancer

## 2022-02-07 NOTE — ASSESSMENT
[FreeTextEntry1] : Ms. SCHWARTZ 56 year F complains of dull aching pain with ear itch x 1 month. SHe was treated a month ago with oral abx for fluid in both ears. On exam both ears are clean without infection or wax \par \par Dry skin of EAC\par Rec-d/c q tip usage\par \par Plan;\par -Rx: Vosol, use prn \par -f/u prn

## 2022-04-26 NOTE — BRIEF OPERATIVE NOTE - SPECIMENS
endometrial curettings Simple Simulation Preamble Text Will Be Included With Simple Simulations (.......... Indications): Simple simulation was performed today for the following reasons:

## 2022-07-26 NOTE — PHYSICAL THERAPY INITIAL EVALUATION ADULT - PATIENT PROFILE REVIEW, REHAB EVAL
How Severe Are Your Spot(S)?: mild yes What Type Of Note Output Would You Prefer (Optional)?: Standard Output What Is The Reason For Today's Visit?: Full Body Skin Examination What Is The Reason For Today's Visit? (Being Monitored For X): concerning skin lesions on an annual basis Declined

## 2023-11-03 NOTE — H&P PST ADULT - VENOUS THROMBOEMBOLISM CURRENT STATUS
Statement Selected
(1) other risk factor (includes escalating BMI, pack-years of smoking, diabetes requiring insulin, chemotherapy, female gender and length of surgery)

## 2024-08-26 NOTE — ED PROVIDER NOTE - RESPIRATORY, MLM
Breath sounds clear and equal bilaterally. Is This A New Presentation, Or A Follow-Up?: Skin Lesion What Type Of Note Output Would You Prefer (Optional)?: Standard Output How Severe Is Your Skin Lesion?: moderate Has Your Skin Lesion Been Treated?: not been treated Additional History: Mom says she has a dark red/black spot she describes as a black head on her nose since she was 4.

## 2024-09-26 NOTE — ED PROVIDER NOTE - CRANIAL NERVE AND PUPILLARY EXAM
Maximum acetaminophen (Tylenol) dose from all sources should not exceed 4 grams (4000 mg) per day. You received 975 mg of Tylenol at 7 AM. Do not take any additional Tylenol until 1 PM.    You received Toradol, an IV form of Ibuprofen (Motrin) at 10 AM.  Do not take any Ibuprofen products until 4 PM. Maximum Ibuprofen/Motrin in 24 hours = 2,400 mg.    You had a medication that turns your urine orange. (It also may stain undergarments.) It will clear with time and drinking water/fluids.    DR. NADIYA CELIS M.D.             CLINIC PHONE NUMBER:  [phone]  [facility]     cranial nerves 2-12 intact/extra-ocular movements intact/tongue is midline

## 2024-12-16 NOTE — H&P PST ADULT - MAMMOGRAM, LAST, PROFILE
Spoke to Patient and advised. Patient has had Mohs procedure previously and is aware of procedure.     SURGICAL PATHOLOGY  Pathologic Diagnosis   Date Value Ref Range Status   12/09/2024   Final    Skin, left inferior preauricular, shave removal:   -Basal cell carcinoma, nodular type.  -Lesion extends to the deep edge of specimen.    Microscopic Description    Sections show aggregates of hyperchromatic basaloid cells with peripheral palisading of nuclei,  from the adjacent stroma by artifactual clefts. The findings are consistent with basal cell carcinoma, nodular subtype.  Lesion extends to deep edge(s).            Mohs procedure was explained to the patient including healing and repair options.     Healing options were explained including healing by secondary intention or closure     Patient was advised to dress comfortably, eat and drink per usual, take all meds other than those listed in the Mohs letter, do not stop anticoagulants. Patient was instructed letter would be sent with additional information and instruction. Letter was mailed to preferred mailing address or sent to [employer] per patient's preference.     Patient verbalized understanding. Patient was given opportunity to ask questions which were fully answered.     Mohs scheduled: Visit date not found, at 1:00 pm , arriving by 12:45 pm     
2018